# Patient Record
Sex: FEMALE | Race: WHITE | NOT HISPANIC OR LATINO | Employment: FULL TIME | URBAN - METROPOLITAN AREA
[De-identification: names, ages, dates, MRNs, and addresses within clinical notes are randomized per-mention and may not be internally consistent; named-entity substitution may affect disease eponyms.]

---

## 2017-05-02 ENCOUNTER — ALLSCRIPTS OFFICE VISIT (OUTPATIENT)
Dept: OTHER | Facility: OTHER | Age: 32
End: 2017-05-02

## 2017-05-02 ENCOUNTER — LAB REQUISITION (OUTPATIENT)
Dept: LAB | Facility: HOSPITAL | Age: 32
End: 2017-05-02
Payer: COMMERCIAL

## 2017-05-02 DIAGNOSIS — Z01.419 ENCOUNTER FOR GYNECOLOGICAL EXAMINATION WITHOUT ABNORMAL FINDING: ICD-10-CM

## 2017-05-02 DIAGNOSIS — Z87.410 HISTORY OF CERVICAL DYSPLASIA: ICD-10-CM

## 2017-05-02 PROCEDURE — G0145 SCR C/V CYTO,THINLAYER,RESCR: HCPCS | Performed by: OBSTETRICS & GYNECOLOGY

## 2017-05-08 ENCOUNTER — GENERIC CONVERSION - ENCOUNTER (OUTPATIENT)
Dept: OTHER | Facility: OTHER | Age: 32
End: 2017-05-08

## 2017-05-08 LAB
LAB AP GYN PRIMARY INTERPRETATION: NORMAL
Lab: NORMAL

## 2018-01-10 NOTE — PROGRESS NOTES
Assessment    1  Acute mucoid otitis media of left ear (381 02) (H12 487)    Plan  Acute mucoid otitis media of left ear    · Azithromycin 250 MG Oral Tablet; TAKE 2 TABLETS ON DAY 1 THEN TAKE 1  TABLET A DAY FOR 4 DAYS   · Call [phone] if: Your ear pain does not go away in 2 days ; Status:Complete;    Done: 85IQD1051 09:40AM   · Call [phone] if: Your hearing is getting worse ; Status:Complete;   Done:  87ZIU8116 09:40AM   · Call [phone] if: Your temperature is higher than 101F ; Status:Complete;   Done:  65PYH7156 09:40AM   · Things to avoid while you have an ear condition ; Status:Complete;   Done: 57ZSB8500  09:40AM    Chief Complaint  B/L ear pain, sore throat , cough, SOB, fever      History of Present Illness  HPI: [place] for congestion and ear pain over the last one to 2 days  Occasional cough as well  Low-grade fevers  She's currently taking TheraFlu with limited results  Review of Systems    Constitutional: No fever, no chills, feels well, no tiredness, no recent weight gain or loss  ENT: no ear ache, no loss of hearing, no nosebleeds or nasal discharge, no sore throat or hoarseness  Cardiovascular: no complaints of slow or fast heart rate, no chest pain, no palpitations, no leg claudication or lower extremity edema  Respiratory: no complaints of shortness of breath, no wheezing, no dyspnea on exertion, no orthopnea or PND  Breasts: no complaints of breast pain, breast lump or nipple discharge  Gastrointestinal: no complaints of abdominal pain, no constipation, no nausea or diarrhea, no vomiting, no bloody stools  Genitourinary: no complaints of dysuria, no incontinence, no pelvic pain, no dysmenorrhea, no vaginal discharge or abnormal vaginal bleeding  Musculoskeletal: no complaints of arthralgia, no myalgia, no joint swelling or stiffness, no limb pain or swelling  Integumentary: no complaints of skin rash or lesion, no itching or dry skin, no skin wounds  Neurological: no complaints of headache, no confusion, no numbness or tingling, no dizziness or fainting  Active Problems    1  Allergic rhinitis (477 9) (J30 9)   2  Fatigue (780 79) (R53 83)   3  Migraine (346 90) (G43 909)   4  Need for hepatitis B vaccination (V05 3) (Z23)   5  Recurrent cold sores (054 9) (B00 1)   6  History of Sexually Active High-risk (V69 2)    Past Medical History    1  Acute sinusitis (461 9) (J01 90)   2  History of Acute sinusitis (461 9) (J01 90)   3  History of Generalized anxiety disorder (300 02) (F41 1)   4  History of acute sinusitis (V12 69) (Z87 09)   5  History of Puncture By Hepatitis B Patient's Needle (V15 85)   6  History of Reactive airway disease (493 90) (J45 909)   7  History of Shortness of breath (786 05) (R06 02)  Active Problems And Past Medical History Reviewed: The active problems and past medical history were reviewed and updated today  Family History    1  No pertinent family history    2  No pertinent family history    3  Family history of malignant neoplasm (V16 9) (Z80 9)    4  Family history of diabetes mellitus (V18 0) (Z83 3)   5  Family history of malignant neoplasm (V16 9) (Z80 9)    6  Family history of Migraine    Social History    · Former smoker (L99 37) (N36 440)   · History of Sexually Active High-risk (V69 2)  The social history was reviewed and updated today  The social history was reviewed and is unchanged  Surgical History    1  History of Abdominoplasty    Current Meds   1  ALPRAZolam 0 25 MG Oral Tablet; 1 tablet every 8 hours as needed for anxiety; Last   Rx:05Hqr3591 Ordered   2  Claritin TABS; Take 1 tablet daily; Therapy: (Recorded:30Apr2015) to Recorded   3  Rizatriptan Benzoate 10 MG Oral Tablet Dispersible; TAKE 1 TABLET Daily PRN   headache; Therapy: 33MUK8005 to (Last Rx:03Oct2014)  Requested for: 79JIH2840 Ordered   4  Sudafed TABS; Therapy: (Recorded:04Oct2012) to Recorded   5   Ventolin  (90 Base) MCG/ACT Inhalation Aerosol Solution; INHALE 2 PUFFS   EVERY 6 HOURS AS NEEDED; Therapy: 37UYV3444 to (Evaluate:51Xer7750)  Requested for: 63QZT6947; Last   Rx:30Bfj2826 Ordered    The medication list was reviewed and updated today  Allergies    1  Benadryl Allergy TABS    Vitals   Recorded: 44AIE5358 08:45AM   Temperature 615 1 F   Systolic 175   Diastolic 70   Height 5 ft 4 in   Weight 160 lb    BMI Calculated 27 46   BSA Calculated 1 78     Physical Exam    Constitutional   General appearance: No acute distress, well appearing and well nourished  Eyes   Conjunctiva and lids: No swelling, erythema or discharge  Pupils and irises: Equal, round and reactive to light  Ears, Nose, Mouth, and Throat   External inspection of ears and nose: Normal     Otoscopic examination: Abnormal   The right tympanic membrane was normal  The left tympanic membrane was red and was bulging  The right external canal was normal  The left external canal was normal    Nasal mucosa, septum, and turbinates: Normal without edema or erythema  Oropharynx: Normal with no erythema, edema, exudate or lesions  Pulmonary   Respiratory effort: No increased work of breathing or signs of respiratory distress  Auscultation of lungs: Clear to auscultation  Cardiovascular   Palpation of heart: Normal PMI, no thrills  Auscultation of heart: Normal rate and rhythm, normal S1 and S2, without murmurs  Examination of extremities for edema and/or varicosities: Normal     Carotid pulses: Normal     Abdomen   Abdomen: Non-tender, no masses  Liver and spleen: No hepatomegaly or splenomegaly  Lymphatic   Palpation of lymph nodes in neck: No lymphadenopathy  Musculoskeletal   Gait and station: Normal     Digits and nails: Normal without clubbing or cyanosis  Inspection/palpation of joints, bones, and muscles: Normal     Skin   Skin and subcutaneous tissue: Normal without rashes or lesions      Neurologic   Cranial nerves: Cranial nerves 2-12 intact  Reflexes: 2+ and symmetric  Sensation: No sensory loss      Psychiatric   Orientation to person, place, and time: Normal     Mood and affect: Normal          Signatures   Electronically signed by : Heike Lafleur DO; Feb 1 2016  9:40AM EST                       (Author)

## 2018-01-13 VITALS
DIASTOLIC BLOOD PRESSURE: 58 MMHG | HEIGHT: 64 IN | SYSTOLIC BLOOD PRESSURE: 100 MMHG | BODY MASS INDEX: 27.91 KG/M2 | WEIGHT: 163.5 LBS

## 2018-01-15 NOTE — RESULT NOTES
Verified Results  (1) THIN PREP PAP WITH IMAGING 02UTZ1417 09:46AM Heather Gearing Order Number: [id number]     Test Name Result Flag Reference   LAB AP CASE REPORT (Report)     Gynecologic Cytology Report            Case: [id number]                  Authorizing Provider: Ian Moreno MD     Collected:      05/02/2017 0946        First Screen:     CINDY Diggs Received:      05/04/2017 0716        Specimen:  LIQUID-BASED PAP, SCREENING, Cervix   LAB AP GYN PRIMARY INTERPRETATION      Negative for intraepithelial lesion or malignancy  Electronically signed by CINDY Diggs on 5/8/2017 at 3:35 PM   LAB AP GYN SPECIMEN ADEQUACY      Satisfactory for evaluation  Endocervical/transformation zone component present  LAB AP GYN ADDITIONAL INFORMATION (Report)     [employer]'s FDA approved ,  and ThinPrep Imaging System are   utilized with strict adherence to the 's instruction manual to   prepare gynecologic and non-gynecologic cytology specimens for the   production of ThinPrep slides as well as for gynecologic ThinPrep imaging  These processes have been validated by our laboratory and/or by the     The Pap test is not a diagnostic procedure and should not be used as the   sole means to detect cervical cancer  It is only a screening procedure to   aid in the detection of cervical cancer and its precursors  Both   false-negative and false-positive results have been experienced  Your   patient's test result should be interpreted in this context together with   the history and clinical findings

## 2018-01-16 NOTE — PROGRESS NOTES
Assessment    1  Bell's palsy (351 0) (G51 0)    Plan  Bell's palsy    · PredniSONE 10 MG Oral Tablet; 6 pills daily for 3 days, then decrease by one pill  each day thereafter    Discussion/Summary    Consider starting prednisone symptoms persist or worsen  Return to office if symptoms worsen over the next several days  Consider MRI if needed  Chief Complaint  L eye droop  Negative for pain, itchiness, or draining  History of Present Illness  HPI: Patient noted some drooping of the left eye 2 days ago  No fevers or chills  No headaches  Review of Systems    Constitutional: No fever, no chills, feels well, no tiredness, no recent weight gain or loss  ENT: as noted in HPI  Cardiovascular: no complaints of slow or fast heart rate, no chest pain, no palpitations, no leg claudication or lower extremity edema  Respiratory: no complaints of shortness of breath, no wheezing, no dyspnea on exertion, no orthopnea or PND  Breasts: no complaints of breast pain, breast lump or nipple discharge  Gastrointestinal: no complaints of abdominal pain, no constipation, no nausea or diarrhea, no vomiting, no bloody stools  Genitourinary: no complaints of dysuria, no incontinence, no pelvic pain, no dysmenorrhea, no vaginal discharge or abnormal vaginal bleeding  Musculoskeletal: no complaints of arthralgia, no myalgia, no joint swelling or stiffness, no limb pain or swelling  Integumentary: no complaints of skin rash or lesion, no itching or dry skin, no skin wounds  Neurological: no complaints of headache, no confusion, no numbness or tingling, no dizziness or fainting  Active Problems    1  Acute mucoid otitis media of left ear (381 02) (H65 112)   2  Allergic rhinitis (477 9) (J30 9)   3  Fatigue (780 79) (R53 83)   4  Migraine (346 90) (G43 909)   5  Need for hepatitis B vaccination (V05 3) (Z23)   6  Recurrent cold sores (054 9) (B00 1)   7   History of Sexually Active High-risk (V69 2)    Past Medical History    1  Acute sinusitis (461 9) (J01 90)   2  History of Acute sinusitis (461 9) (J01 90)   3  History of Generalized anxiety disorder (300 02) (F41 1)   4  History of acute sinusitis (V12 69) (Z87 09)   5  History of Puncture By Hepatitis B Patient's Needle (V15 85)   6  History of Reactive airway disease (493 90) (J45 909)   7  History of Shortness of breath (786 05) (R06 02)  Active Problems And Past Medical History Reviewed: The active problems and past medical history were reviewed and updated today  Family History    1  No pertinent family history    2  No pertinent family history    3  Family history of malignant neoplasm (V16 9) (Z80 9)    4  Family history of diabetes mellitus (V18 0) (Z83 3)   5  Family history of malignant neoplasm (V16 9) (Z80 9)    6  Family history of Migraine    Social History    · Former smoker (S09 54) (V28 098)   · History of Sexually Active High-risk (V69 2)  The social history was reviewed and updated today  The social history was reviewed and is unchanged  Surgical History    1  History of Abdominoplasty    Current Meds   1  ALPRAZolam 0 25 MG Oral Tablet; 1 tablet every 8 hours as needed for anxiety; Last   Rx:23Yrn1972 Ordered   2  Azithromycin 250 MG Oral Tablet; TAKE 2 TABLETS ON DAY 1 THEN TAKE 1 TABLET A   DAY FOR 4 DAYS; Therapy: 04THS8685 to (Last Rx:55Hhe0103)  Requested for: 80MBV1400 Ordered   3  Claritin TABS; Take 1 tablet daily; Therapy: (Recorded:45Byo5923) to Recorded   4  Rizatriptan Benzoate 10 MG Oral Tablet Dispersible; TAKE 1 TABLET Daily PRN   headache; Therapy: 62BIU1220 to (Last Rx:28Hcw8556)  Requested for: 42OZA4523 Ordered   5  Sudafed TABS; Therapy: (Recorded:16Nsl3516) to Recorded   6  Ventolin  (90 Base) MCG/ACT Inhalation Aerosol Solution; INHALE 2 PUFFS   EVERY 6 HOURS AS NEEDED; Therapy: 54YWM7265 to (Evaluate:29Vvv6773)  Requested for: 06VYM9158;  Last   Rx:14Cwb8513 Ordered    The medication list was reviewed and updated today  Allergies    1  Benadryl Allergy TABS    Vitals   Recorded: 48IXT5033 11:49AM   Temperature 60 5 F   Systolic 371   Diastolic 70   Height 5 ft 4 in   Weight 160 lb    BMI Calculated 27 46   BSA Calculated 1 78     Physical Exam    Constitutional   General appearance: No acute distress, well appearing and well nourished  Eyes   Conjunctiva and lids: Abnormal   mild ptosis left eye  Pupils and irises: Equal, round and reactive to light  Ears, Nose, Mouth, and Throat   External inspection of ears and nose: Normal     Otoscopic examination: Tympanic membranes translucent with normal light reflex  Canals patent without erythema  Nasal mucosa, septum, and turbinates: Normal without edema or erythema  Oropharynx: Normal with no erythema, edema, exudate or lesions  Pulmonary   Respiratory effort: No increased work of breathing or signs of respiratory distress  Auscultation of lungs: Clear to auscultation  Cardiovascular   Palpation of heart: Normal PMI, no thrills  Auscultation of heart: Normal rate and rhythm, normal S1 and S2, without murmurs  Examination of extremities for edema and/or varicosities: Normal     Carotid pulses: Normal     Abdomen   Abdomen: Non-tender, no masses  Liver and spleen: No hepatomegaly or splenomegaly  Lymphatic   Palpation of lymph nodes in neck: No lymphadenopathy  Musculoskeletal   Gait and station: Normal     Digits and nails: Normal without clubbing or cyanosis  Inspection/palpation of joints, bones, and muscles: Normal     Skin   Skin and subcutaneous tissue: Normal without rashes or lesions  Neurologic   Cranial nerves: Cranial nerves 2-12 intact  Reflexes: 2+ and symmetric  Sensation: No sensory loss      Psychiatric   Orientation to person, place, and time: Normal     Mood and affect: Normal          Signatures   Electronically signed by : Yuliana Escobedo DO; Feb 4 2016  1:20PM EST (Author)

## 2018-01-18 NOTE — RESULT NOTES
Verified Results  (B) PAP (REFLEX TO HPV PLUS WHEN ASC-US) 14Apr2016 12:00AM Hank Silva     Test Name Result Flag Reference   PAP, LIQUID-BASED NILM     DIAGNOSIS:            Negative for intraepithelial lesion or malignancy  ADEQUACY:             Satisfactory for evaluation / Satisfactory for                         evaluation  COMMENT:              This Pap smear was screened with the assistance                         of the [employer]Prep(TM) Imaging System and                         screened by a cytotechnologist   SPECIMEN SOURCE:      PAP (RFLX HPV PLUS WHENASC-US), VAGINAL  CLINICAL INFORMATION: LMP: N/A                        Provided Diagnosis Codes: Z01 419,Z87 410,                         V13 22, V72 31                                                Cervicovaginal cytology should be considered a                         screening procedure subject to false negatives                         and false positives  Results are more reliable                         when a satisfactory sample is obtained on a                         regular repetitive basis, and should be                         interpreted together with past and current                         clinical data    ELECTRONICALLY SIGNED   BY:                   Screened By: Marito Martínez   Case                         Electronically Signed 04/18/2016

## 2018-05-17 PROBLEM — Z01.419 ENCOUNTER FOR ANNUAL ROUTINE GYNECOLOGICAL EXAMINATION: Status: ACTIVE | Noted: 2018-05-17

## 2018-05-17 PROBLEM — Z12.4 SCREENING FOR CERVICAL CANCER: Status: ACTIVE | Noted: 2018-05-17

## 2018-05-17 PROBLEM — Z87.410 HISTORY OF CERVICAL DYSPLASIA: Status: ACTIVE | Noted: 2018-05-17

## 2018-05-17 NOTE — PROGRESS NOTES
Assessment/Plan:  Normal gynecologic exam  RTO 1 yr  History of MURALI-2- annual Pap smears till '28, CoTest '18  Self breast exams monthly  BCM- NA  Ca 1,000 mg/d with Vit D  Exercise 3/wk-she does 5  Diagnoses and all orders for this visit:    Encounter for annual routine gynecological examination    Screening for cervical cancer    History of cervical dysplasia          Subjective:      Patient ID: Milagro Umana is a 35 y o  female  Eliazar is here for annual visit  She had a few questions-  My thoughts on thermal imaging for breast cancer  She has 2 friends in their 35s who been diagnosed with breast cancer  Calcium and its association with hair changes  In the past year her hair has become frizzied and she stopped ingesting dairy due to lactose intolerant  1-2 days before each menses she notes vaginal itching which resolves within a day or so  This is cyclic and has been present for the past year or 2     -her risk for breast cancer extremely low over the next 5 years  She has no family history of breast cancer  Thermal imaging is not considered main stream nor would it be covered by her insurance at this time   -she may want to take calcium supplements with magnesium because she has a history of constipation and see if that benefits her hair  Otherwise she may want to see a dermatologist   -cyclic yeast growth associated with vaginal pH was discussed  She could use 1 Monistat 7 the night before she typically becomes symptomatic  The following portions of the patient's history were reviewed and updated as appropriate:   She  has a past medical history of Allergic rhinitis (04/30/2015); Bell's palsy (02/04/2016); Generalized anxiety disorder (10/04/2012); Migraine (10/03/2014); Reactive airway disease (09/05/2013); and Shortness of breath (09/05/2013)    PMH:  Menarche 15  G0  R Bunionectomy '03  MURLAI-2 cryosurgery 5/08, + HR HPV '09  Anxiety  Cold Sores  Circumferential abdominoplasty following a 70 pound weight loss, '14  Hypothyroidism '16  Lactose intolerant '15     Patient Active Problem List    Diagnosis Date Noted    Encounter for annual routine gynecological examination 2018    Screening for cervical cancer 2018    History of cervical dysplasia 2018     She  has a past surgical history that includes Abdominoplasty and Mouth surgery  Her family history includes Cancer in her maternal grandfather and paternal grandfather; Diabetes in her paternal grandfather; Migraines in her paternal aunt; No Known Problems in her father and mother  FH:    MGF- bladder cancer 78  PU- brain cancer,  62   PGF- MI 68, DM   MGM- TIA's    She  reports that she quit smoking about 11 years ago  She does not have any smokeless tobacco history on file  She reports that she drinks alcohol  Her drug history is not on file  SH:  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  Former smoker who quit in   Not currently in a relationship  No current outpatient prescriptions on file  No current facility-administered medications for this visit  No current outpatient prescriptions on file prior to visit  No current facility-administered medications on file prior to visit  She has no allergies on file       Review of Systems   Constitutional: Negative for activity change, appetite change, fatigue and unexpected weight change  Eyes: Negative for visual disturbance  Respiratory: Negative for cough, chest tightness, shortness of breath and wheezing  Cardiovascular: Negative for chest pain, palpitations and leg swelling  Breast: Patient denies tenderness, nipple discharge, masses, or erythema  Gastrointestinal: Negative for abdominal distention, abdominal pain, blood in stool, constipation, diarrhea, nausea and vomiting  Endocrine: Negative for cold intolerance and heat intolerance     Genitourinary: Negative for decreased urine volume, difficulty urinating, dyspareunia, dysuria, frequency, hematuria, menstrual problem, pelvic pain, urgency, vaginal bleeding, vaginal discharge and vaginal pain  Musculoskeletal: Negative for arthralgias  Skin: Negative for rash  Neurological: Negative for weakness, light-headedness, numbness and headaches  Hematological: Does not bruise/bleed easily  Psychiatric/Behavioral: Negative for agitation, behavioral problems and sleep disturbance  The patient is not nervous/anxious  Objective: There were no vitals taken for this visit  Physical Exam   Constitutional: She is oriented to person, place, and time  She appears well-developed and well-nourished  HENT:   Head: Normocephalic and atraumatic  Eyes: Conjunctivae and EOM are normal  Pupils are equal, round, and reactive to light  Neck: Normal range of motion  Neck supple  No tracheal deviation present  No thyromegaly present  Cardiovascular: Normal rate, regular rhythm and normal heart sounds  No murmur heard  Pulmonary/Chest: Effort normal and breath sounds normal  No respiratory distress  She has no wheezes  Right breast exhibits no inverted nipple, no mass, no nipple discharge, no skin change and no tenderness  Left breast exhibits no inverted nipple, no mass, no nipple discharge, no skin change and no tenderness  Breasts are symmetrical    Abdominal: Soft  Bowel sounds are normal  She exhibits no distension and no mass  There is no tenderness  Genitourinary: Vagina normal and uterus normal  Rectal exam shows no external hemorrhoid  No breast swelling, tenderness, discharge or bleeding  There is no rash, tenderness or lesion on the right labia  There is no rash, tenderness or lesion on the left labia  Uterus is not deviated, not enlarged and not tender  Cervix exhibits no motion tenderness and no discharge  Right adnexum displays no mass, no tenderness and no fullness  Left adnexum displays no mass, no tenderness and no fullness     Musculoskeletal: Normal range of motion  Neurological: She is alert and oriented to person, place, and time  Skin: Skin is warm and dry  Psychiatric: She has a normal mood and affect  Her behavior is normal  Judgment and thought content normal    Nursing note and vitals reviewed

## 2018-05-18 ENCOUNTER — ANNUAL EXAM (OUTPATIENT)
Dept: GYNECOLOGY | Facility: CLINIC | Age: 33
End: 2018-05-18
Payer: COMMERCIAL

## 2018-05-18 VITALS
SYSTOLIC BLOOD PRESSURE: 112 MMHG | BODY MASS INDEX: 24.89 KG/M2 | DIASTOLIC BLOOD PRESSURE: 62 MMHG | WEIGHT: 145.8 LBS | HEIGHT: 64 IN | HEART RATE: 93 BPM

## 2018-05-18 DIAGNOSIS — Z12.4 SCREENING FOR CERVICAL CANCER: ICD-10-CM

## 2018-05-18 DIAGNOSIS — Z87.410 HISTORY OF CERVICAL DYSPLASIA: ICD-10-CM

## 2018-05-18 DIAGNOSIS — Z01.419 ENCOUNTER FOR ANNUAL ROUTINE GYNECOLOGICAL EXAMINATION: Primary | ICD-10-CM

## 2018-05-18 PROCEDURE — S0612 ANNUAL GYNECOLOGICAL EXAMINA: HCPCS | Performed by: OBSTETRICS & GYNECOLOGY

## 2018-05-18 PROCEDURE — 87624 HPV HI-RISK TYP POOLED RSLT: CPT | Performed by: OBSTETRICS & GYNECOLOGY

## 2018-05-18 PROCEDURE — G0124 SCREEN C/V THIN LAYER BY MD: HCPCS | Performed by: PATHOLOGY

## 2018-05-18 PROCEDURE — G0145 SCR C/V CYTO,THINLAYER,RESCR: HCPCS | Performed by: PATHOLOGY

## 2018-05-18 RX ORDER — CHOLECALCIFEROL (VITAMIN D3) 1250 MCG
CAPSULE ORAL
COMMUNITY
End: 2022-05-05 | Stop reason: ALTCHOICE

## 2018-05-18 RX ORDER — ALBUTEROL SULFATE 90 UG/1
2 AEROSOL, METERED RESPIRATORY (INHALATION) EVERY 6 HOURS PRN
COMMUNITY
Start: 2013-09-05

## 2018-05-18 RX ORDER — CHOLECALCIFEROL (VITAMIN D3) 125 MCG
TABLET ORAL
COMMUNITY
End: 2020-05-29 | Stop reason: ALTCHOICE

## 2018-05-18 RX ORDER — LEVOTHYROXINE SODIUM 0.03 MG/1
TABLET ORAL
COMMUNITY
End: 2021-07-22

## 2018-05-18 RX ORDER — RIZATRIPTAN BENZOATE 10 MG/1
1 TABLET, ORALLY DISINTEGRATING ORAL DAILY PRN
COMMUNITY
Start: 2014-10-03

## 2018-05-18 RX ORDER — ALPRAZOLAM 0.25 MG/1
1 TABLET ORAL EVERY 8 HOURS PRN
COMMUNITY
End: 2021-07-22

## 2018-05-22 LAB — HPV RRNA GENITAL QL NAA+PROBE: NORMAL

## 2018-05-24 LAB
LAB AP GYN PRIMARY INTERPRETATION: NORMAL
Lab: NORMAL
PATH INTERP SPEC-IMP: NORMAL

## 2018-11-08 ENCOUNTER — TELEPHONE (OUTPATIENT)
Dept: GYNECOLOGY | Facility: CLINIC | Age: 33
End: 2018-11-08

## 2018-11-08 NOTE — TELEPHONE ENCOUNTER
Pt called stating she is getting migraines every other month before her period  She wants to know if there anything she can do about it

## 2018-11-08 NOTE — TELEPHONE ENCOUNTER
When women have migraines is usually centered around their cycles  Your family physician or a neurologist would take care of this  If your on oral contraceptives then we would recommend you stop      Please call the patient with this message

## 2018-12-31 ENCOUNTER — OFFICE VISIT (OUTPATIENT)
Dept: GYNECOLOGY | Facility: CLINIC | Age: 33
End: 2018-12-31
Payer: COMMERCIAL

## 2018-12-31 VITALS — WEIGHT: 167.2 LBS | BODY MASS INDEX: 28.54 KG/M2 | HEIGHT: 64 IN

## 2018-12-31 DIAGNOSIS — Z30.09 ENCOUNTER FOR OTHER GENERAL COUNSELING OR ADVICE ON CONTRACEPTION: Primary | ICD-10-CM

## 2018-12-31 PROBLEM — Z30.9 CONTRACEPTIVE MANAGEMENT: Status: ACTIVE | Noted: 2018-12-31

## 2018-12-31 PROCEDURE — 99213 OFFICE O/P EST LOW 20 MIN: CPT | Performed by: OBSTETRICS & GYNECOLOGY

## 2018-12-31 NOTE — PROGRESS NOTES
Assessment/Plan:  Return the office for ParaGard insertion during menses  Two Advil 1 hr beforehand       Diagnoses and all orders for this visit:    Encounter for other general counseling or advice on contraception      Birth control options were fully discussed including withdrawal, condoms, oral contraceptives, vaginal contraceptive, long acting injections, Nexplanon, and IUDs which include [address] [facility],[address], [place] [address], [place], and [place]  This included efficacy and benefits versus risks  She was most interested in IUDs  Since she has not delivered a child I thought [place] would be her best option  However, she wanted to steer clear of any hormonal related contraceptive  ParaGard was explained in great detail regarding patient's satisfaction, retention rates, possible adverse effect on menses both in volume and pain, and the risks of insertion- infection, perforation, and expulsion  In the and she wanted us to order a ParaGard  Today's discussion took 16 min all of which was spent on counseling  Subjective:        Patient ID: Marsha Morales is a 35 y o  female  Eliazar has reunited with her on and off boyfriend of 10 years  They have been having intercourse for the past month without protection  Her periods are regular and last 5 days  They are heavy the 1st 2  In the past she had used oral contraceptives but currently would like to avoid anything with hormones in it  She has never had a child  She is interested in the [address] IUD  The following portions of the patient's history were reviewed and updated as appropriate: She  has a past medical history of Allergic rhinitis (04/30/2015); Bell's palsy (02/04/2016); Generalized anxiety disorder (10/04/2012); Migraine (10/03/2014); Reactive airway disease (09/05/2013); and Shortness of breath (09/05/2013)    Patient Active Problem List    Diagnosis Date Noted    Contraceptive management 12/31/2018    Encounter for annual routine gynecological examination 05/17/2018    Screening for cervical cancer 05/17/2018    History of cervical dysplasia 05/17/2018     She  has a past surgical history that includes Abdominoplasty and Mouth surgery  Her family history includes Cancer in her maternal grandfather; Diabetes in her paternal grandfather; Heart disease in her paternal grandfather; Migraines in her paternal aunt; No Known Problems in her brother, father, mother, and sister  She  reports that she quit smoking about 12 years ago  She has never used smokeless tobacco  She reports that she drinks alcohol  She reports that she does not use drugs  Current Outpatient Prescriptions   Medication Sig Dispense Refill    ALPRAZolam (XANAX) 0 25 mg tablet Take 1 tablet by mouth every 8 (eight) hours as needed      Cholecalciferol (VITAMIN D3) 20349 units CAPS Take by mouth      Ergocalciferol (VITAMIN D2) 2000 units TABS Take by mouth      levothyroxine 25 mcg tablet Take by mouth      albuterol (VENTOLIN HFA) 90 mcg/act inhaler Inhale 2 puffs every 6 (six) hours as needed      rizatriptan (MAXALT-MLT) 10 MG disintegrating tablet Take 1 tablet by mouth daily as needed       No current facility-administered medications for this visit  Current Outpatient Prescriptions on File Prior to Visit   Medication Sig    ALPRAZolam (XANAX) 0 25 mg tablet Take 1 tablet by mouth every 8 (eight) hours as needed    Cholecalciferol (VITAMIN D3) 26917 units CAPS Take by mouth    Ergocalciferol (VITAMIN D2) 2000 units TABS Take by mouth    levothyroxine 25 mcg tablet Take by mouth    albuterol (VENTOLIN HFA) 90 mcg/act inhaler Inhale 2 puffs every 6 (six) hours as needed    rizatriptan (MAXALT-MLT) 10 MG disintegrating tablet Take 1 tablet by mouth daily as needed     No current facility-administered medications on file prior to visit  She is allergic to diphenhydramine       Review of Systems   Constitutional: Negative      Respiratory: Negative for chest tightness and shortness of breath  Cardiovascular: Negative for chest pain  Gastrointestinal: Negative for abdominal pain  Genitourinary: Negative for dyspareunia and pelvic pain  Objective:    Vitals:    12/31/18 1500   Weight: 75 8 kg (167 lb 3 2 oz)   Height: 5' 4" (1 626 m)            Physical Exam   Constitutional: She is oriented to person, place, and time  She appears well-developed and well-nourished  No distress  Pulmonary/Chest: Effort normal    Neurological: She is alert and oriented to person, place, and time  Skin: Skin is warm and dry  Psychiatric: She has a normal mood and affect   Her behavior is normal  Judgment and thought content normal

## 2019-01-28 ENCOUNTER — PROCEDURE VISIT (OUTPATIENT)
Dept: GYNECOLOGY | Facility: CLINIC | Age: 34
End: 2019-01-28
Payer: COMMERCIAL

## 2019-01-28 VITALS
BODY MASS INDEX: 28.82 KG/M2 | DIASTOLIC BLOOD PRESSURE: 58 MMHG | HEIGHT: 64 IN | SYSTOLIC BLOOD PRESSURE: 118 MMHG | WEIGHT: 168.8 LBS

## 2019-01-28 DIAGNOSIS — Z30.430 ENCOUNTER FOR IUD INSERTION: Primary | ICD-10-CM

## 2019-01-28 PROCEDURE — 58300 INSERT INTRAUTERINE DEVICE: CPT | Performed by: OBSTETRICS & GYNECOLOGY

## 2019-01-28 RX ORDER — COPPER 313.4 MG/1
1 INTRAUTERINE DEVICE INTRAUTERINE ONCE
Status: COMPLETED | OUTPATIENT
Start: 2019-01-28 | End: 2019-01-28

## 2019-01-28 RX ORDER — COPPER 313.4 MG/1
1 INTRAUTERINE DEVICE INTRAUTERINE ONCE
COMMUNITY
End: 2021-07-22

## 2019-01-28 RX ADMIN — COPPER 1 INTRA UTERINE DEVICE: 313.4 INTRAUTERINE DEVICE INTRAUTERINE at 16:12

## 2019-01-28 NOTE — PROGRESS NOTES
Iud insertions  Date/Time: 1/28/2019 3:46 PM  Performed by: Claude Mountain by: Marga Valdez     Consent:     Consent obtained:  Verbal and written    Consent given by:  Patient    Procedure risks and benefits discussed: yes      Patient questions answered: yes      Patient agrees, verbalizes understanding, and wants to proceed: yes      Instructions and paperwork completed: yes    Procedure:     Pelvic exam performed: December  Negative urine pregnancy test: LMP last week, no intercourse since mid December  Cervix cleaned and prepped: yes      Speculum placed in vagina: yes      Tenaculum applied to cervix: yes      Uterus sounded: yes      IUD inserted with no complications: yes      IUD type:  ParaGard    Strings trimmed: yes      Uterus sound depth (cm):  8  Post-procedure:     Patient tolerated procedure well: yes      Patient will follow up after next period: yes        Patient did very well  Will return in 1 month for follow-up  Instructions given

## 2019-02-27 ENCOUNTER — OFFICE VISIT (OUTPATIENT)
Dept: GYNECOLOGY | Facility: CLINIC | Age: 34
End: 2019-02-27
Payer: COMMERCIAL

## 2019-02-27 VITALS
WEIGHT: 166.6 LBS | SYSTOLIC BLOOD PRESSURE: 102 MMHG | DIASTOLIC BLOOD PRESSURE: 56 MMHG | HEIGHT: 64 IN | BODY MASS INDEX: 28.44 KG/M2

## 2019-02-27 DIAGNOSIS — Z97.5 IUD (INTRAUTERINE DEVICE) IN PLACE: Primary | ICD-10-CM

## 2019-02-27 PROCEDURE — 99212 OFFICE O/P EST SF 10 MIN: CPT | Performed by: OBSTETRICS & GYNECOLOGY

## 2019-02-27 RX ORDER — ERGOCALCIFEROL 1.25 MG/1
CAPSULE ORAL
COMMUNITY
Start: 2019-02-17 | End: 2020-05-29

## 2019-02-27 RX ORDER — AMOXICILLIN AND CLAVULANATE POTASSIUM 875; 125 MG/1; MG/1
TABLET, FILM COATED ORAL
COMMUNITY
Start: 2019-01-07 | End: 2021-07-22

## 2019-02-27 NOTE — PROGRESS NOTES
Assessment/Plan:  Normal IUD check- 2 white strings present- ParaGard  RTO annual    There are no diagnoses linked to this encounter  Subjective:        Patient ID: Niki Thurston is a 35 y o  female  Oswaldo  is without complaints  She was unable to feel 1 string  She was pleasantly surprised that her menses was not as heavy or as long as she was prepared for  She has noted more of a mucus discharge  The following portions of the patient's history were reviewed and updated as appropriate: She  has a past medical history of Allergic rhinitis (2015), Bell's palsy (2016), Generalized anxiety disorder (10/04/2012), Migraine (10/03/2014), Reactive airway disease (2013), and Shortness of breath (2013)  Patient Active Problem List    Diagnosis Date Noted    Encounter for IUD insertion 2019    Contraceptive management 2018    Encounter for annual routine gynecological examination 2018    Screening for cervical cancer 2018    History of cervical dysplasia 2018     She  has a past surgical history that includes Abdominoplasty and Mouth surgery  Her family history includes Breast cancer in her paternal aunt; Cancer in her maternal grandfather; Diabetes in her paternal grandfather; Heart disease in her paternal grandfather; Migraines in her paternal aunt; No Known Problems in her brother, father, mother, and sister  She  reports that she quit smoking about 12 years ago  She has never used smokeless tobacco  She reports that she drinks alcohol  She reports that she does not use drugs    Current Outpatient Medications   Medication Sig Dispense Refill    albuterol (VENTOLIN HFA) 90 mcg/act inhaler Inhale 2 puffs every 6 (six) hours as needed      ALPRAZolam (XANAX) 0 25 mg tablet Take 1 tablet by mouth every 8 (eight) hours as needed      amoxicillin-clavulanate (AUGMENTIN) 875-125 mg per tablet       Cholecalciferol (VITAMIN D3) 99254 units CAPS Take by mouth      Ergocalciferol (VITAMIN D2) 2000 units TABS Take by mouth      ergocalciferol (VITAMIN D2) 50,000 units       intrauterine copper (PARAGARD) IUD 1 each by Intrauterine route once      levothyroxine 25 mcg tablet Take by mouth      rizatriptan (MAXALT-MLT) 10 MG disintegrating tablet Take 1 tablet by mouth daily as needed       No current facility-administered medications for this visit  Current Outpatient Medications on File Prior to Visit   Medication Sig    albuterol (VENTOLIN HFA) 90 mcg/act inhaler Inhale 2 puffs every 6 (six) hours as needed    ALPRAZolam (XANAX) 0 25 mg tablet Take 1 tablet by mouth every 8 (eight) hours as needed    amoxicillin-clavulanate (AUGMENTIN) 875-125 mg per tablet     Cholecalciferol (VITAMIN D3) 37140 units CAPS Take by mouth    Ergocalciferol (VITAMIN D2) 2000 units TABS Take by mouth    ergocalciferol (VITAMIN D2) 50,000 units     intrauterine copper (PARAGARD) IUD 1 each by Intrauterine route once    levothyroxine 25 mcg tablet Take by mouth    rizatriptan (MAXALT-MLT) 10 MG disintegrating tablet Take 1 tablet by mouth daily as needed     No current facility-administered medications on file prior to visit  She is allergic to diphenhydramine       Review of Systems   Constitutional: Negative  Objective:    Vitals:    02/27/19 1554   BP: 102/56   BP Location: Right arm   Patient Position: Sitting   Cuff Size: Standard   Weight: 75 6 kg (166 lb 9 6 oz)   Height: 5' 4" (1 626 m)            Physical Exam   Constitutional: She is oriented to person, place, and time  She appears well-developed and well-nourished  No distress  Pulmonary/Chest: Effort normal    Genitourinary: Vagina normal  Cervix exhibits no discharge and no friability  Genitourinary Comments: IUD string present  Neurological: She is alert and oriented to person, place, and time  Nursing note and vitals reviewed

## 2019-05-24 ENCOUNTER — ANNUAL EXAM (OUTPATIENT)
Dept: GYNECOLOGY | Facility: CLINIC | Age: 34
End: 2019-05-24
Payer: COMMERCIAL

## 2019-05-24 VITALS
BODY MASS INDEX: 28.13 KG/M2 | HEIGHT: 64 IN | SYSTOLIC BLOOD PRESSURE: 118 MMHG | WEIGHT: 164.8 LBS | DIASTOLIC BLOOD PRESSURE: 62 MMHG

## 2019-05-24 DIAGNOSIS — Z01.419 ENCOUNTER FOR ANNUAL ROUTINE GYNECOLOGICAL EXAMINATION: Primary | ICD-10-CM

## 2019-05-24 DIAGNOSIS — Z97.5 IUD (INTRAUTERINE DEVICE) IN PLACE: ICD-10-CM

## 2019-05-24 DIAGNOSIS — Z12.4 SCREENING FOR CERVICAL CANCER: ICD-10-CM

## 2019-05-24 DIAGNOSIS — Z87.410 HISTORY OF CERVICAL DYSPLASIA: ICD-10-CM

## 2019-05-24 PROCEDURE — 87624 HPV HI-RISK TYP POOLED RSLT: CPT | Performed by: OBSTETRICS & GYNECOLOGY

## 2019-05-24 PROCEDURE — G0124 SCREEN C/V THIN LAYER BY MD: HCPCS | Performed by: PATHOLOGY

## 2019-05-24 PROCEDURE — G0145 SCR C/V CYTO,THINLAYER,RESCR: HCPCS | Performed by: PATHOLOGY

## 2019-05-24 PROCEDURE — S0612 ANNUAL GYNECOLOGICAL EXAMINA: HCPCS | Performed by: OBSTETRICS & GYNECOLOGY

## 2019-06-04 LAB
LAB AP GYN PRIMARY INTERPRETATION: ABNORMAL
Lab: ABNORMAL
PATH INTERP SPEC-IMP: ABNORMAL

## 2019-06-06 LAB
HPV HR 12 DNA CVX QL NAA+PROBE: NEGATIVE
HPV16 DNA CVX QL NAA+PROBE: NEGATIVE
HPV18 DNA CVX QL NAA+PROBE: NEGATIVE

## 2020-03-03 ENCOUNTER — TRANSCRIBE ORDERS (OUTPATIENT)
Dept: URGENT CARE | Facility: CLINIC | Age: 35
End: 2020-03-03

## 2020-03-03 ENCOUNTER — APPOINTMENT (OUTPATIENT)
Dept: LAB | Facility: CLINIC | Age: 35
End: 2020-03-03
Payer: COMMERCIAL

## 2020-03-03 DIAGNOSIS — E78.5 HYPERLIPIDEMIA, UNSPECIFIED HYPERLIPIDEMIA TYPE: ICD-10-CM

## 2020-03-03 DIAGNOSIS — R73.01 IMPAIRED FASTING GLUCOSE: Primary | ICD-10-CM

## 2020-03-03 DIAGNOSIS — D51.0 PERNICIOUS ANEMIA: ICD-10-CM

## 2020-03-03 DIAGNOSIS — E03.9 PRIMARY HYPOTHYROIDISM: ICD-10-CM

## 2020-03-03 DIAGNOSIS — D64.9 ANEMIA, UNSPECIFIED TYPE: ICD-10-CM

## 2020-03-03 DIAGNOSIS — E55.9 VITAMIN D DEFICIENCY: ICD-10-CM

## 2020-03-03 DIAGNOSIS — R73.01 IMPAIRED FASTING GLUCOSE: ICD-10-CM

## 2020-03-03 LAB
25(OH)D3 SERPL-MCNC: 31 NG/ML (ref 30–100)
ALBUMIN SERPL BCP-MCNC: 4 G/DL (ref 3.5–5)
ALP SERPL-CCNC: 45 U/L (ref 46–116)
ALT SERPL W P-5'-P-CCNC: 19 U/L (ref 12–78)
ANION GAP SERPL CALCULATED.3IONS-SCNC: 5 MMOL/L (ref 4–13)
AST SERPL W P-5'-P-CCNC: 28 U/L (ref 5–45)
BACTERIA UR QL AUTO: ABNORMAL /HPF
BASOPHILS # BLD AUTO: 0.02 THOUSANDS/ΜL (ref 0–0.1)
BASOPHILS NFR BLD AUTO: 0 % (ref 0–1)
BILIRUB SERPL-MCNC: 0.39 MG/DL (ref 0.2–1)
BILIRUB UR QL STRIP: NEGATIVE
BUN SERPL-MCNC: 15 MG/DL (ref 5–25)
CALCIUM SERPL-MCNC: 8.8 MG/DL (ref 8.3–10.1)
CHLORIDE SERPL-SCNC: 107 MMOL/L (ref 100–108)
CHOLEST SERPL-MCNC: 157 MG/DL (ref 50–200)
CLARITY UR: ABNORMAL
CO2 SERPL-SCNC: 27 MMOL/L (ref 21–32)
COLOR UR: YELLOW
CREAT SERPL-MCNC: 0.87 MG/DL (ref 0.6–1.3)
EOSINOPHIL # BLD AUTO: 0.05 THOUSAND/ΜL (ref 0–0.61)
EOSINOPHIL NFR BLD AUTO: 1 % (ref 0–6)
ERYTHROCYTE [DISTWIDTH] IN BLOOD BY AUTOMATED COUNT: 14.6 % (ref 11.6–15.1)
FERRITIN SERPL-MCNC: 6 NG/ML (ref 8–388)
GFR SERPL CREATININE-BSD FRML MDRD: 87 ML/MIN/1.73SQ M
GLUCOSE P FAST SERPL-MCNC: 78 MG/DL (ref 65–99)
GLUCOSE UR STRIP-MCNC: NEGATIVE MG/DL
HCT VFR BLD AUTO: 39.4 % (ref 34.8–46.1)
HDLC SERPL-MCNC: 63 MG/DL
HGB BLD-MCNC: 12.3 G/DL (ref 11.5–15.4)
HGB UR QL STRIP.AUTO: ABNORMAL
IMM GRANULOCYTES # BLD AUTO: 0 THOUSAND/UL (ref 0–0.2)
IMM GRANULOCYTES NFR BLD AUTO: 0 % (ref 0–2)
KETONES UR STRIP-MCNC: NEGATIVE MG/DL
LDLC SERPL CALC-MCNC: 83 MG/DL (ref 0–100)
LEUKOCYTE ESTERASE UR QL STRIP: NEGATIVE
LYMPHOCYTES # BLD AUTO: 1.59 THOUSANDS/ΜL (ref 0.6–4.47)
LYMPHOCYTES NFR BLD AUTO: 30 % (ref 14–44)
MCH RBC QN AUTO: 28.3 PG (ref 26.8–34.3)
MCHC RBC AUTO-ENTMCNC: 31.2 G/DL (ref 31.4–37.4)
MCV RBC AUTO: 91 FL (ref 82–98)
MONOCYTES # BLD AUTO: 0.54 THOUSAND/ΜL (ref 0.17–1.22)
MONOCYTES NFR BLD AUTO: 10 % (ref 4–12)
NEUTROPHILS # BLD AUTO: 3.13 THOUSANDS/ΜL (ref 1.85–7.62)
NEUTS SEG NFR BLD AUTO: 59 % (ref 43–75)
NITRITE UR QL STRIP: NEGATIVE
NON-SQ EPI CELLS URNS QL MICRO: ABNORMAL /HPF
NONHDLC SERPL-MCNC: 94 MG/DL
NRBC BLD AUTO-RTO: 0 /100 WBCS
PH UR STRIP.AUTO: 8 [PH]
PLATELET # BLD AUTO: 300 THOUSANDS/UL (ref 149–390)
PMV BLD AUTO: 10 FL (ref 8.9–12.7)
POTASSIUM SERPL-SCNC: 4.3 MMOL/L (ref 3.5–5.3)
PROT SERPL-MCNC: 7.1 G/DL (ref 6.4–8.2)
PROT UR STRIP-MCNC: ABNORMAL MG/DL
RBC # BLD AUTO: 4.35 MILLION/UL (ref 3.81–5.12)
RBC #/AREA URNS AUTO: ABNORMAL /HPF
SODIUM SERPL-SCNC: 139 MMOL/L (ref 136–145)
SP GR UR STRIP.AUTO: 1.02 (ref 1–1.03)
T4 FREE SERPL-MCNC: 1.05 NG/DL (ref 0.76–1.46)
TIBC SERPL-MCNC: 384 UG/DL (ref 250–450)
TRI-PHOS CRY URNS QL MICRO: ABNORMAL /HPF
TRIGL SERPL-MCNC: 53 MG/DL
TSH SERPL DL<=0.05 MIU/L-ACNC: 3.24 UIU/ML (ref 0.36–3.74)
UROBILINOGEN UR QL STRIP.AUTO: 0.2 E.U./DL
VIT B12 SERPL-MCNC: 367 PG/ML (ref 100–900)
WBC # BLD AUTO: 5.33 THOUSAND/UL (ref 4.31–10.16)
WBC #/AREA URNS AUTO: ABNORMAL /HPF

## 2020-03-03 PROCEDURE — 82306 VITAMIN D 25 HYDROXY: CPT

## 2020-03-03 PROCEDURE — 81001 URINALYSIS AUTO W/SCOPE: CPT

## 2020-03-03 PROCEDURE — 82728 ASSAY OF FERRITIN: CPT

## 2020-03-03 PROCEDURE — 82607 VITAMIN B-12: CPT

## 2020-03-03 PROCEDURE — 80061 LIPID PANEL: CPT

## 2020-03-03 PROCEDURE — 85025 COMPLETE CBC W/AUTO DIFF WBC: CPT

## 2020-03-03 PROCEDURE — 84443 ASSAY THYROID STIM HORMONE: CPT

## 2020-03-03 PROCEDURE — 84439 ASSAY OF FREE THYROXINE: CPT

## 2020-03-03 PROCEDURE — 83550 IRON BINDING TEST: CPT

## 2020-03-03 PROCEDURE — 80053 COMPREHEN METABOLIC PANEL: CPT

## 2020-03-03 PROCEDURE — 36415 COLL VENOUS BLD VENIPUNCTURE: CPT

## 2020-03-10 ENCOUNTER — TELEPHONE (OUTPATIENT)
Dept: GYNECOLOGY | Facility: CLINIC | Age: 35
End: 2020-03-10

## 2020-03-10 NOTE — TELEPHONE ENCOUNTER
Pt wants to have her Paragard IUD taken out because she feels like it is cause her to not lose weight when she is on a calorie diet  Could it be from the IUD and should she have it taken out?

## 2020-03-10 NOTE — TELEPHONE ENCOUNTER
Please let Margot know that there are no hormones or anything else that can cause weight gain or prevent weight loss with the ParaGard  ParaGard is sometimes associated with heavier, more painful menses which we had discussed  Bariatric medicine provides very thorough advice for weight loss without surgery  If you could give her a phone number that might help  I do not recommend the IUD be removed for this concern

## 2020-03-19 ENCOUNTER — APPOINTMENT (OUTPATIENT)
Dept: LAB | Facility: CLINIC | Age: 35
End: 2020-03-19
Payer: COMMERCIAL

## 2020-03-19 ENCOUNTER — TRANSCRIBE ORDERS (OUTPATIENT)
Dept: URGENT CARE | Facility: CLINIC | Age: 35
End: 2020-03-19

## 2020-03-19 DIAGNOSIS — N95.1 SYMPTOMATIC MENOPAUSAL OR FEMALE CLIMACTERIC STATES: ICD-10-CM

## 2020-03-19 DIAGNOSIS — N95.1 SYMPTOMATIC MENOPAUSAL OR FEMALE CLIMACTERIC STATES: Primary | ICD-10-CM

## 2020-03-19 LAB
ESTRADIOL SERPL-MCNC: 77 PG/ML
FSH SERPL-ACNC: 5.3 MIU/ML
LH SERPL-ACNC: 5.4 MIU/ML

## 2020-03-19 PROCEDURE — 82670 ASSAY OF TOTAL ESTRADIOL: CPT

## 2020-03-19 PROCEDURE — 83001 ASSAY OF GONADOTROPIN (FSH): CPT

## 2020-03-19 PROCEDURE — 36415 COLL VENOUS BLD VENIPUNCTURE: CPT

## 2020-03-19 PROCEDURE — 83002 ASSAY OF GONADOTROPIN (LH): CPT

## 2020-05-29 ENCOUNTER — ANNUAL EXAM (OUTPATIENT)
Dept: GYNECOLOGY | Facility: CLINIC | Age: 35
End: 2020-05-29
Payer: COMMERCIAL

## 2020-05-29 VITALS
HEIGHT: 64 IN | BODY MASS INDEX: 30.56 KG/M2 | WEIGHT: 179 LBS | SYSTOLIC BLOOD PRESSURE: 102 MMHG | DIASTOLIC BLOOD PRESSURE: 62 MMHG

## 2020-05-29 DIAGNOSIS — Z01.419 ENCOUNTER FOR ANNUAL ROUTINE GYNECOLOGICAL EXAMINATION: Primary | ICD-10-CM

## 2020-05-29 DIAGNOSIS — Z87.410 HISTORY OF CERVICAL DYSPLASIA: ICD-10-CM

## 2020-05-29 DIAGNOSIS — Z97.5 IUD (INTRAUTERINE DEVICE) IN PLACE: ICD-10-CM

## 2020-05-29 DIAGNOSIS — Z12.4 SCREENING FOR CERVICAL CANCER: ICD-10-CM

## 2020-05-29 DIAGNOSIS — R63.5 UNEXPLAINED WEIGHT GAIN: ICD-10-CM

## 2020-05-29 PROCEDURE — G0145 SCR C/V CYTO,THINLAYER,RESCR: HCPCS | Performed by: OBSTETRICS & GYNECOLOGY

## 2020-05-29 PROCEDURE — S0612 ANNUAL GYNECOLOGICAL EXAMINA: HCPCS | Performed by: OBSTETRICS & GYNECOLOGY

## 2020-05-29 RX ORDER — NITROFURANTOIN 25; 75 MG/1; MG/1
CAPSULE ORAL
COMMUNITY
Start: 2020-03-30 | End: 2021-07-22

## 2020-06-08 LAB
LAB AP GYN PRIMARY INTERPRETATION: NORMAL
Lab: NORMAL

## 2020-12-12 ENCOUNTER — NURSE TRIAGE (OUTPATIENT)
Dept: OTHER | Facility: OTHER | Age: 35
End: 2020-12-12

## 2020-12-12 DIAGNOSIS — U07.1 COVID-19 DETERMINED BY CLINICAL DIAGNOSTIC CRITERIA: Primary | ICD-10-CM

## 2020-12-13 DIAGNOSIS — U07.1 COVID-19 DETERMINED BY CLINICAL DIAGNOSTIC CRITERIA: ICD-10-CM

## 2020-12-13 PROCEDURE — U0003 INFECTIOUS AGENT DETECTION BY NUCLEIC ACID (DNA OR RNA); SEVERE ACUTE RESPIRATORY SYNDROME CORONAVIRUS 2 (SARS-COV-2) (CORONAVIRUS DISEASE [COVID-19]), AMPLIFIED PROBE TECHNIQUE, MAKING USE OF HIGH THROUGHPUT TECHNOLOGIES AS DESCRIBED BY CMS-2020-01-R: HCPCS | Performed by: FAMILY MEDICINE

## 2020-12-15 LAB — SARS-COV-2 RNA SPEC QL NAA+PROBE: NOT DETECTED

## 2021-02-03 PROCEDURE — 58301 REMOVE INTRAUTERINE DEVICE: CPT | Performed by: OBSTETRICS & GYNECOLOGY

## 2021-02-04 ENCOUNTER — PROCEDURE VISIT (OUTPATIENT)
Dept: OBGYN CLINIC | Facility: CLINIC | Age: 36
End: 2021-02-04

## 2021-02-04 VITALS
SYSTOLIC BLOOD PRESSURE: 112 MMHG | WEIGHT: 182.6 LBS | HEIGHT: 64 IN | BODY MASS INDEX: 31.18 KG/M2 | DIASTOLIC BLOOD PRESSURE: 72 MMHG

## 2021-02-04 DIAGNOSIS — Z30.432 ENCOUNTER FOR IUD REMOVAL: Primary | ICD-10-CM

## 2021-02-04 DIAGNOSIS — Z12.39 ENCOUNTER FOR SCREENING BREAST EXAMINATION: ICD-10-CM

## 2021-02-04 DIAGNOSIS — N63.0 BREAST LUMP: ICD-10-CM

## 2021-02-04 PROCEDURE — 99212 OFFICE O/P EST SF 10 MIN: CPT | Performed by: OBSTETRICS & GYNECOLOGY

## 2021-02-04 NOTE — PROGRESS NOTES
Assessment/Plan:   Normal breast exam   IUD easily removed  Withdrawal for contraception  Does not want to add another variable to the weight gain concern       Diagnoses and all orders for this visit:    Encounter for IUD removal  -     Iud removal    Breast lump    Encounter for screening breast examination              Subjective:        Patient ID: Tania Bobby is a 28 y o  female  Farrel Buggy is here to have the IUD removed  She continued to gain weight and could not find any other etiology when last seen in May she weighed 179  In September she weighed 190 and currently which she weighs 182  She has been evaluated by her PCP,  has seen a nutritionist,  and has use multiple diets  It has been hypothesis size that this may be the result of an inflammatory process possibly related to copper  She has also experienced migraines and vertigo  The ParaGard was placed in January of 2019  In 2015 she did weigh 146 lb  Her menses began 3 days ago  It was the normal timing  Yesterday doing self-breast exam she thought she felt a lump in the left breast but not today  She is getting  in May with the hope of conceiving at the end of the summer  The following portions of the patient's history were reviewed and updated as appropriate: She  has a past medical history of Allergic rhinitis (04/30/2015), Bell's palsy (02/04/2016), Generalized anxiety disorder (10/04/2012), Migraine (10/03/2014), Reactive airway disease (09/05/2013), and Shortness of breath (09/05/2013)    Patient Active Problem List    Diagnosis Date Noted    IUD (intrauterine device) in place 02/27/2019    Encounter for IUD insertion 01/28/2019    Contraceptive management 12/31/2018    Encounter for annual routine gynecological examination 05/17/2018    Screening for cervical cancer 05/17/2018    History of cervical dysplasia 05/17/2018     She  has a past surgical history that includes Abdominoplasty; Mouth surgery; and Bunionectomy  Her family history includes Breast cancer in her paternal aunt; Cancer in her maternal grandfather; Diabetes in her paternal grandfather; Heart disease in her paternal grandfather; Migraines in her paternal aunt; No Known Problems in her brother, father, mother, and sister  She  reports that she quit smoking about 14 years ago  She has never used smokeless tobacco  She reports current alcohol use  She reports that she does not use drugs  Current Outpatient Medications   Medication Sig Dispense Refill    albuterol (VENTOLIN HFA) 90 mcg/act inhaler Inhale 2 puffs every 6 (six) hours as needed      ALPRAZolam (XANAX) 0 25 mg tablet Take 1 tablet by mouth every 8 (eight) hours as needed      amoxicillin-clavulanate (AUGMENTIN) 875-125 mg per tablet       Cholecalciferol (VITAMIN D3) 74429 units CAPS Take by mouth      intrauterine copper (PARAGARD) IUD 1 each by Intrauterine route once      levothyroxine 25 mcg tablet Take by mouth      nitrofurantoin (MACROBID) 100 mg capsule       Probiotic Product (PROBIOTIC PO) Take by mouth      rizatriptan (MAXALT-MLT) 10 MG disintegrating tablet Take 1 tablet by mouth daily as needed       No current facility-administered medications for this visit        Current Outpatient Medications on File Prior to Visit   Medication Sig    albuterol (VENTOLIN HFA) 90 mcg/act inhaler Inhale 2 puffs every 6 (six) hours as needed    ALPRAZolam (XANAX) 0 25 mg tablet Take 1 tablet by mouth every 8 (eight) hours as needed    amoxicillin-clavulanate (AUGMENTIN) 875-125 mg per tablet     Cholecalciferol (VITAMIN D3) 07447 units CAPS Take by mouth    intrauterine copper (PARAGARD) IUD 1 each by Intrauterine route once    levothyroxine 25 mcg tablet Take by mouth    nitrofurantoin (MACROBID) 100 mg capsule     Probiotic Product (PROBIOTIC PO) Take by mouth    rizatriptan (MAXALT-MLT) 10 MG disintegrating tablet Take 1 tablet by mouth daily as needed     No current facility-administered medications on file prior to visit  She is allergic to diphenhydramine       Review of Systems   Constitutional: Positive for unexpected weight change  Negative for activity change, appetite change and fatigue  Gastrointestinal: Negative for abdominal pain  Genitourinary: Negative for dyspareunia, menstrual problem, pelvic pain and vaginal pain  Skin: Negative for rash  Questionable breast lump  She felt something yesterday bone repeating the exam today was not palpable  Neurological: Positive for dizziness and light-headedness  Objective:    Vitals:    02/04/21 1123   BP: 112/72   BP Location: Left arm   Patient Position: Sitting   Cuff Size: Standard   Weight: 82 8 kg (182 lb 9 6 oz)   Height: 5' 4" (1 626 m)            Physical Exam  Vitals signs and nursing note reviewed  Exam conducted with a chaperone present  Constitutional:       General: She is not in acute distress  Appearance: Normal appearance  She is not ill-appearing, toxic-appearing or diaphoretic  HENT:      Head: Normocephalic and atraumatic  Eyes:      General: No scleral icterus  Right eye: No discharge  Left eye: No discharge  Extraocular Movements: Extraocular movements intact  Neck:      Musculoskeletal: Normal range of motion and neck supple  No neck rigidity or muscular tenderness  Pulmonary:      Effort: Pulmonary effort is normal  No respiratory distress  Chest:      Breasts:         Right: Normal  No swelling, bleeding, inverted nipple, mass, nipple discharge, skin change or tenderness  Left: Normal  No swelling, bleeding, inverted nipple, mass, nipple discharge, skin change or tenderness  Abdominal:      General: Abdomen is flat  There is no distension  Palpations: There is no mass  Tenderness: There is no abdominal tenderness  There is no guarding or rebound  Genitourinary:     General: Normal vulva  Comments:   There is menstrual  Bleeding present  the cervix is closed and nulliparous  The IUD string is present  Lymphadenopathy:      Cervical: No cervical adenopathy  Skin:     General: Skin is warm and dry  Neurological:      Mental Status: She is alert and oriented to person, place, and time  Psychiatric:         Mood and Affect: Mood normal          Behavior: Behavior normal          Thought Content: Thought content normal          Judgment: Judgment normal            Iud removal    Date/Time: 2/3/2021 10:21 PM  Performed by: Nataliya Ybarra MD  Authorized by: Nataliya Ybarra MD   Universal Protocol:  Consent: Verbal consent obtained  Written consent obtained  Risks and benefits: risks, benefits and alternatives were discussed  Consent given by: patient  Patient understanding: patient states understanding of the procedure being performed  Patient consent: the patient's understanding of the procedure matches consent given  Procedure consent: procedure consent matches procedure scheduled  Patient identity confirmed: verbally with patient      Procedure:     Removed with no complications: yes      Other reason for removal:   possible association with weight gain and getting  in May  Comments: The IUD was easily removed and intact  It was cleaned off and placed in a bio has her bag  This was given to [facility] just in case it needed to be analyzed in the future

## 2021-07-21 NOTE — PROGRESS NOTES
Assessment/Plan:  Normal gynecologic exam  RTO 1 yr  BCM- Condoms, begin PNV +  mcg now [NTD discussed]  Unexplained weight gain- normal hormonal studies  Recommend visit to bariatric medical physician for evaluation, if that failed an endocrinologist  Ann Soares removed 2/21 hoping it was related  She actually lost 10 lb since then  Normal TSH, FSH, LH, estradiol, vitamin-D, vitamin B12 via PCP  History of MURALI-2- annual Pap smears till '28, CoTested '18- Pap alone  Self breast exams monthly  BCM- Paragard  Ca 1,000 mg/d with Vit D  Exercise 3/wk-she does 5         Diagnoses and all orders for this visit:    Encounter for annual routine gynecological examination  -     Liquid-based pap, screening    History of cervical dysplasia  -     Liquid-based pap, screening    Screening for cervical cancer  -     Liquid-based pap, screening    Unexplained weight gain              Subjective:        Patient ID: Daren Bella is a 39 y o  female  Eliazar is here for her yearly evaluation  She is happy that she has lost 10 lb since the IUD was removed in February  Nothing else has changed  This was a ParaGard IUD  Currently they are using condoms and sometimes rhythm for contraception  Pregnancy attempts will begin in the fall  I recommended prenatal vitamins with extra folic acid beginning now  Neural tube defects were explained  Her menses are every 28 days lasting 4 days  The following portions of the patient's history were reviewed and updated as appropriate: She  has a past medical history of Allergic rhinitis (04/30/2015), Bell's palsy (02/04/2016), Generalized anxiety disorder (10/04/2012), Migraine (10/03/2014), Reactive airway disease (09/05/2013), and Shortness of breath (09/05/2013)    Patient Active Problem List    Diagnosis Date Noted    IUD (intrauterine device) in place 02/27/2019    Encounter for IUD insertion 01/28/2019    Contraceptive management 12/31/2018    Encounter for annual routine gynecological examination 2018    Screening for cervical cancer 2018    History of cervical dysplasia 2018   PMH:  Menarche 12  G0  R Bunionectomy '  Anorexia (I was not aware until today)  MURALI-2 cryosurgery , + HR HPV   Anxiety  Cold Sores  Obesity  Circumferential abdominoplasty following a 70 pound weight loss,   Hypothyroidism '      Lactose intolerant '15       Paragard IUD       Unexplained weight gain - present [ 146 '15 to 182  ]      Paragard removed   She  has a past surgical history that includes Abdominoplasty; Mouth surgery; and Bunionectomy  Her family history includes Breast cancer in her maternal aunt and paternal aunt; Cancer in her maternal grandfather; Diabetes in her paternal grandfather; Heart disease in her paternal grandfather; Lung cancer in her maternal aunt; Migraines in her paternal aunt; No Known Problems in her brother, father, mother, and sister  FH:  MGF- bladder cancer 78  PU- brain cancer,  62   PGF- MI 68, DM   MGM- TIA's  PA- Breast Ca 49  She  reports that she quit smoking about 14 years ago  She has never used smokeless tobacco  She reports current alcohol use  She reports that she does not use drugs  SH:  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 [place]  Former smoker who quit in '  Since  back with her boyfriend of 14 years who moved back from [place] [address] to conceive in the   Current Outpatient Medications   Medication Sig Dispense Refill    albuterol (VENTOLIN HFA) 90 mcg/act inhaler Inhale 2 puffs every 6 (six) hours as needed      Cholecalciferol (VITAMIN D3) 97793 units CAPS Take by mouth      Probiotic Product (PROBIOTIC PO) Take by mouth      rizatriptan (MAXALT-MLT) 10 MG disintegrating tablet Take 1 tablet by mouth daily as needed       No current facility-administered medications for this visit       Current Outpatient Medications on File Prior to Visit   Medication Sig    albuterol (VENTOLIN HFA) 90 mcg/act inhaler Inhale 2 puffs every 6 (six) hours as needed    Cholecalciferol (VITAMIN D3) 88924 units CAPS Take by mouth    Probiotic Product (PROBIOTIC PO) Take by mouth    rizatriptan (MAXALT-MLT) 10 MG disintegrating tablet Take 1 tablet by mouth daily as needed    [DISCONTINUED] ALPRAZolam (XANAX) 0 25 mg tablet Take 1 tablet by mouth every 8 (eight) hours as needed    [DISCONTINUED] amoxicillin-clavulanate (AUGMENTIN) 875-125 mg per tablet     [DISCONTINUED] intrauterine copper (PARAGARD) IUD 1 each by Intrauterine route once    [DISCONTINUED] levothyroxine 25 mcg tablet Take by mouth    [DISCONTINUED] nitrofurantoin (MACROBID) 100 mg capsule      No current facility-administered medications on file prior to visit  She is allergic to diphenhydramine       Review of Systems   Constitutional: Negative for activity change, appetite change, fatigue and unexpected weight change  Eyes: Negative for visual disturbance  Respiratory: Negative for cough, chest tightness, shortness of breath and wheezing  Cardiovascular: Negative for chest pain, palpitations and leg swelling  Breast: Patient denies tenderness, nipple discharge, masses, or erythema  Gastrointestinal: Negative for abdominal distention, abdominal pain, blood in stool, constipation, diarrhea, nausea and vomiting  Endocrine: Negative for cold intolerance and heat intolerance  Genitourinary: Negative for decreased urine volume, difficulty urinating, dyspareunia, dysuria, frequency, hematuria, menstrual problem, pelvic pain, urgency, vaginal bleeding, vaginal discharge and vaginal pain  Musculoskeletal: Negative for arthralgias  Skin: Negative for rash  Neurological: Negative for weakness, light-headedness, numbness and headaches  Hematological: Does not bruise/bleed easily  Psychiatric/Behavioral: Negative for agitation, behavioral problems and sleep disturbance  The patient is not nervous/anxious  Objective:    Vitals:    07/22/21 1050   BP: 102/60   Weight: 78 2 kg (172 lb 6 4 oz)            Physical Exam  Vitals and nursing note reviewed  Exam conducted with a chaperone present  Constitutional:       General: She is not in acute distress  Appearance: She is well-developed  HENT:      Head: Normocephalic and atraumatic  Eyes:      General: No scleral icterus  Right eye: No discharge  Left eye: No discharge  Extraocular Movements: Extraocular movements intact  Conjunctiva/sclera: Conjunctivae normal    Neck:      Thyroid: No thyromegaly  Trachea: No tracheal deviation  Cardiovascular:      Rate and Rhythm: Normal rate and regular rhythm  Heart sounds: Normal heart sounds  No murmur heard  Pulmonary:      Effort: Pulmonary effort is normal  No respiratory distress  Breath sounds: Normal breath sounds  No wheezing  Chest:      Breasts: Breasts are symmetrical          Right: No inverted nipple, mass, nipple discharge, skin change or tenderness  Left: No inverted nipple, mass, nipple discharge, skin change or tenderness  Abdominal:      General: Bowel sounds are normal  There is no distension  Palpations: Abdomen is soft  There is no mass  Tenderness: There is no abdominal tenderness  There is no right CVA tenderness or left CVA tenderness  Genitourinary:     General: Normal vulva  Exam position: Supine  Labia:         Right: No rash, tenderness or lesion  Left: No rash, tenderness or lesion  Vagina: Normal       Cervix: No cervical motion tenderness or discharge  Uterus: Not deviated, not enlarged and not tender  Adnexa:         Right: No mass, tenderness or fullness  Left: No mass, tenderness or fullness  Rectum: No external hemorrhoid  Comments: Urethral meatus within normal limits  Perineum within normal limits  Bladder well supported   Uterus is retroverted  Musculoskeletal:         General: Normal range of motion  Cervical back: Normal range of motion and neck supple  Lymphadenopathy:      Cervical: No cervical adenopathy  Skin:     General: Skin is warm and dry  Neurological:      Mental Status: She is alert and oriented to person, place, and time  Psychiatric:         Mood and Affect: Mood normal          Behavior: Behavior normal          Thought Content:  Thought content normal          Judgment: Judgment normal

## 2021-07-22 ENCOUNTER — ANNUAL EXAM (OUTPATIENT)
Dept: OBGYN CLINIC | Facility: CLINIC | Age: 36
End: 2021-07-22
Payer: COMMERCIAL

## 2021-07-22 VITALS — WEIGHT: 172.4 LBS | BODY MASS INDEX: 29.59 KG/M2 | DIASTOLIC BLOOD PRESSURE: 60 MMHG | SYSTOLIC BLOOD PRESSURE: 102 MMHG

## 2021-07-22 DIAGNOSIS — Z12.4 SCREENING FOR CERVICAL CANCER: ICD-10-CM

## 2021-07-22 DIAGNOSIS — Z01.419 ENCOUNTER FOR ANNUAL ROUTINE GYNECOLOGICAL EXAMINATION: Primary | ICD-10-CM

## 2021-07-22 DIAGNOSIS — Z87.410 HISTORY OF CERVICAL DYSPLASIA: ICD-10-CM

## 2021-07-22 DIAGNOSIS — R63.5 UNEXPLAINED WEIGHT GAIN: ICD-10-CM

## 2021-07-22 PROCEDURE — S0612 ANNUAL GYNECOLOGICAL EXAMINA: HCPCS | Performed by: OBSTETRICS & GYNECOLOGY

## 2021-07-22 PROCEDURE — G0145 SCR C/V CYTO,THINLAYER,RESCR: HCPCS | Performed by: OBSTETRICS & GYNECOLOGY

## 2021-07-29 LAB
LAB AP GYN PRIMARY INTERPRETATION: NORMAL
Lab: NORMAL

## 2022-01-20 ENCOUNTER — APPOINTMENT (OUTPATIENT)
Dept: LAB | Facility: CLINIC | Age: 37
End: 2022-01-20
Payer: COMMERCIAL

## 2022-01-20 DIAGNOSIS — R79.9 ABNORMAL BLOOD CHEMISTRY: ICD-10-CM

## 2022-01-20 DIAGNOSIS — E55.9 VITAMIN D DEFICIENCY: ICD-10-CM

## 2022-01-20 DIAGNOSIS — E34.9 ENDOCRINE PROBLEM: ICD-10-CM

## 2022-01-20 DIAGNOSIS — R68.82 DECREASED LIBIDO: ICD-10-CM

## 2022-01-20 DIAGNOSIS — R73.01 IMPAIRED FASTING GLUCOSE: ICD-10-CM

## 2022-01-20 DIAGNOSIS — D64.9 ANEMIA, UNSPECIFIED TYPE: ICD-10-CM

## 2022-01-20 DIAGNOSIS — R53.83 FATIGUE, UNSPECIFIED TYPE: ICD-10-CM

## 2022-01-20 DIAGNOSIS — E03.9 HYPOTHYROIDISM, UNSPECIFIED TYPE: ICD-10-CM

## 2022-01-20 DIAGNOSIS — E53.8 VITAMIN B 12 DEFICIENCY: ICD-10-CM

## 2022-01-20 DIAGNOSIS — K21.9 GASTROESOPHAGEAL REFLUX DISEASE WITHOUT ESOPHAGITIS: ICD-10-CM

## 2022-01-20 DIAGNOSIS — E88.81 METABOLIC SYNDROME: ICD-10-CM

## 2022-01-20 DIAGNOSIS — R63.4 LOSS OF WEIGHT: ICD-10-CM

## 2022-01-20 LAB
ANION GAP SERPL CALCULATED.3IONS-SCNC: 4 MMOL/L (ref 4–13)
BASOPHILS # BLD AUTO: 0.02 THOUSANDS/ΜL (ref 0–0.1)
BASOPHILS NFR BLD AUTO: 1 % (ref 0–1)
BUN SERPL-MCNC: 16 MG/DL (ref 5–25)
CALCIUM SERPL-MCNC: 8.6 MG/DL (ref 8.3–10.1)
CHLORIDE SERPL-SCNC: 109 MMOL/L (ref 100–108)
CO2 SERPL-SCNC: 26 MMOL/L (ref 21–32)
CREAT SERPL-MCNC: 0.82 MG/DL (ref 0.6–1.3)
EOSINOPHIL # BLD AUTO: 0.02 THOUSAND/ΜL (ref 0–0.61)
EOSINOPHIL NFR BLD AUTO: 1 % (ref 0–6)
ERYTHROCYTE [DISTWIDTH] IN BLOOD BY AUTOMATED COUNT: 13.1 % (ref 11.6–15.1)
FOLATE SERPL-MCNC: >20 NG/ML (ref 3.1–17.5)
FSH SERPL-ACNC: 3.3 MIU/ML
GFR SERPL CREATININE-BSD FRML MDRD: 92 ML/MIN/1.73SQ M
GLUCOSE P FAST SERPL-MCNC: 89 MG/DL (ref 65–99)
HCT VFR BLD AUTO: 35.8 % (ref 34.8–46.1)
HGB BLD-MCNC: 11.6 G/DL (ref 11.5–15.4)
IMM GRANULOCYTES # BLD AUTO: 0.02 THOUSAND/UL (ref 0–0.2)
IMM GRANULOCYTES NFR BLD AUTO: 1 % (ref 0–2)
INSULIN SERPL-ACNC: 5.5 MU/L (ref 3–25)
LYMPHOCYTES # BLD AUTO: 1.18 THOUSANDS/ΜL (ref 0.6–4.47)
LYMPHOCYTES NFR BLD AUTO: 30 % (ref 14–44)
MAGNESIUM SERPL-MCNC: 2.3 MG/DL (ref 1.6–2.6)
MCH RBC QN AUTO: 29.6 PG (ref 26.8–34.3)
MCHC RBC AUTO-ENTMCNC: 32.4 G/DL (ref 31.4–37.4)
MCV RBC AUTO: 91 FL (ref 82–98)
MONOCYTES # BLD AUTO: 0.45 THOUSAND/ΜL (ref 0.17–1.22)
MONOCYTES NFR BLD AUTO: 11 % (ref 4–12)
NEUTROPHILS # BLD AUTO: 2.29 THOUSANDS/ΜL (ref 1.85–7.62)
NEUTS SEG NFR BLD AUTO: 56 % (ref 43–75)
NRBC BLD AUTO-RTO: 0 /100 WBCS
PHOSPHATE SERPL-MCNC: 2.6 MG/DL (ref 2.7–4.5)
PLATELET # BLD AUTO: 326 THOUSANDS/UL (ref 149–390)
PMV BLD AUTO: 9.5 FL (ref 8.9–12.7)
POTASSIUM SERPL-SCNC: 3.9 MMOL/L (ref 3.5–5.3)
PROGEST SERPL-MCNC: 5.8 NG/ML
RBC # BLD AUTO: 3.92 MILLION/UL (ref 3.81–5.12)
SODIUM SERPL-SCNC: 139 MMOL/L (ref 136–145)
TIBC SERPL-MCNC: 396 UG/DL (ref 250–450)
TSH SERPL DL<=0.05 MIU/L-ACNC: 2.22 UIU/ML (ref 0.36–3.74)
VIT B12 SERPL-MCNC: 957 PG/ML (ref 100–900)
WBC # BLD AUTO: 3.98 THOUSAND/UL (ref 4.31–10.16)

## 2022-01-20 PROCEDURE — 86258 DGP ANTIBODY EACH IG CLASS: CPT

## 2022-01-20 PROCEDURE — 82607 VITAMIN B-12: CPT

## 2022-01-20 PROCEDURE — 84100 ASSAY OF PHOSPHORUS: CPT

## 2022-01-20 PROCEDURE — 86231 EMA EACH IG CLASS: CPT

## 2022-01-20 PROCEDURE — 36415 COLL VENOUS BLD VENIPUNCTURE: CPT

## 2022-01-20 PROCEDURE — 83550 IRON BINDING TEST: CPT

## 2022-01-20 PROCEDURE — 84443 ASSAY THYROID STIM HORMONE: CPT

## 2022-01-20 PROCEDURE — 84144 ASSAY OF PROGESTERONE: CPT

## 2022-01-20 PROCEDURE — 82542 COL CHROMOTOGRAPHY QUAL/QUAN: CPT

## 2022-01-20 PROCEDURE — 82746 ASSAY OF FOLIC ACID SERUM: CPT

## 2022-01-20 PROCEDURE — 83525 ASSAY OF INSULIN: CPT

## 2022-01-20 PROCEDURE — 82784 ASSAY IGA/IGD/IGG/IGM EACH: CPT

## 2022-01-20 PROCEDURE — 83789 MASS SPECTROMETRY QUAL/QUAN: CPT

## 2022-01-20 PROCEDURE — 86364 TISS TRNSGLTMNASE EA IG CLAS: CPT

## 2022-01-20 PROCEDURE — 83735 ASSAY OF MAGNESIUM: CPT

## 2022-01-20 PROCEDURE — 84402 ASSAY OF FREE TESTOSTERONE: CPT

## 2022-01-20 PROCEDURE — 85025 COMPLETE CBC W/AUTO DIFF WBC: CPT

## 2022-01-20 PROCEDURE — 80048 BASIC METABOLIC PNL TOTAL CA: CPT

## 2022-01-20 PROCEDURE — 83001 ASSAY OF GONADOTROPIN (FSH): CPT

## 2022-01-20 PROCEDURE — 84403 ASSAY OF TOTAL TESTOSTERONE: CPT

## 2022-01-21 LAB
ENDOMYSIUM IGA SER QL: NEGATIVE
GLIADIN PEPTIDE IGA SER-ACNC: 5 UNITS (ref 0–19)
GLIADIN PEPTIDE IGG SER-ACNC: 3 UNITS (ref 0–19)
IGA SERPL-MCNC: 122 MG/DL (ref 87–352)
TESTOST FREE SERPL-MCNC: 1 PG/ML (ref 0–4.2)
TESTOST SERPL-MCNC: 17 NG/DL (ref 8–60)
TTG IGA SER-ACNC: <2 U/ML (ref 0–3)
TTG IGG SER-ACNC: 6 U/ML (ref 0–5)

## 2022-01-25 LAB
IODINE SERPL-MCNC: 41.1 UG/L (ref 40–92)
UBIQUINONE10 SERPL-MCNC: 0.88 UG/ML (ref 0.37–2.2)

## 2022-05-02 ENCOUNTER — APPOINTMENT (OUTPATIENT)
Dept: LAB | Facility: HOSPITAL | Age: 37
End: 2022-05-02
Attending: PLASTIC SURGERY
Payer: COMMERCIAL

## 2022-05-02 DIAGNOSIS — Z01.812 PRE-OPERATIVE LABORATORY EXAMINATION: ICD-10-CM

## 2022-05-02 LAB
APTT PPP: 29 SECONDS (ref 23–37)
BASOPHILS # BLD AUTO: 0.04 THOUSANDS/ΜL (ref 0–0.1)
BASOPHILS NFR BLD AUTO: 1 % (ref 0–1)
EOSINOPHIL # BLD AUTO: 0.02 THOUSAND/ΜL (ref 0–0.61)
EOSINOPHIL NFR BLD AUTO: 0 % (ref 0–6)
ERYTHROCYTE [DISTWIDTH] IN BLOOD BY AUTOMATED COUNT: 14.6 % (ref 11.6–15.1)
HCT VFR BLD AUTO: 41.3 % (ref 34.8–46.1)
HGB BLD-MCNC: 13.2 G/DL (ref 11.5–15.4)
IMM GRANULOCYTES # BLD AUTO: 0.02 THOUSAND/UL (ref 0–0.2)
IMM GRANULOCYTES NFR BLD AUTO: 0 % (ref 0–2)
INR PPP: 1.01 (ref 0.84–1.19)
LYMPHOCYTES # BLD AUTO: 1.74 THOUSANDS/ΜL (ref 0.6–4.47)
LYMPHOCYTES NFR BLD AUTO: 27 % (ref 14–44)
MCH RBC QN AUTO: 27.7 PG (ref 26.8–34.3)
MCHC RBC AUTO-ENTMCNC: 32 G/DL (ref 31.4–37.4)
MCV RBC AUTO: 87 FL (ref 82–98)
MONOCYTES # BLD AUTO: 0.69 THOUSAND/ΜL (ref 0.17–1.22)
MONOCYTES NFR BLD AUTO: 11 % (ref 4–12)
NEUTROPHILS # BLD AUTO: 3.85 THOUSANDS/ΜL (ref 1.85–7.62)
NEUTS SEG NFR BLD AUTO: 61 % (ref 43–75)
NRBC BLD AUTO-RTO: 0 /100 WBCS
PLATELET # BLD AUTO: 309 THOUSANDS/UL (ref 149–390)
PMV BLD AUTO: 9.6 FL (ref 8.9–12.7)
PROTHROMBIN TIME: 12.9 SECONDS (ref 11.6–14.5)
RBC # BLD AUTO: 4.76 MILLION/UL (ref 3.81–5.12)
WBC # BLD AUTO: 6.36 THOUSAND/UL (ref 4.31–10.16)

## 2022-05-02 PROCEDURE — 85730 THROMBOPLASTIN TIME PARTIAL: CPT

## 2022-05-02 PROCEDURE — 85025 COMPLETE CBC W/AUTO DIFF WBC: CPT

## 2022-05-02 PROCEDURE — 85610 PROTHROMBIN TIME: CPT

## 2022-05-02 PROCEDURE — 36415 COLL VENOUS BLD VENIPUNCTURE: CPT

## 2022-05-05 RX ORDER — LEVOTHYROXINE SODIUM 0.05 MG/1
50 TABLET ORAL
COMMUNITY
Start: 2022-04-12

## 2022-05-05 RX ORDER — LIOTHYRONINE SODIUM 25 UG/1
TABLET ORAL
COMMUNITY
Start: 2022-04-12

## 2022-05-05 NOTE — PRE-PROCEDURE INSTRUCTIONS
Pre-Surgery Instructions:   Medication Instructions    albuterol (VENTOLIN HFA) 90 mcg/act inhaler Uses PRN- OK to take day of surgery    levothyroxine 50 mcg tablet Take day of surgery   liothyronine (CYTOMEL) 25 mcg tablet Take day of surgery   Probiotic Product (PROBIOTIC PO) Hold day of surgery   rizatriptan (MAXALT-MLT) 10 MG disintegrating tablet Uses PRN- DO NOT take day of surgery    You will receive a phone call from hospital for arrival time  Please call surgeons office if any changes in your condition  Wear easy on/off clothing; consider type of surgery;  Valuables, jewelry, piercing's please keep at home  **COVID-19  education/surgical guidelines  Updated covid    Visitation policy  Please: No contact lenses or eye make up, artificial eyelashes    Please secure transportation     Follow pre surgery showering or cleaning instructions as  Reviewed by nurse or surgeons office      Questions answered and concerns addressed

## 2022-05-11 NOTE — H&P
H&P Exam - Andria Gonzalez 40 y o  female MRN: [id number]    Unit/Bed#:  Encounter: [id number]    Assessment:  Bilateral upper eyelid blepharochalasis    Plan:  Bilateral upper eyelid functional blepharoplasty    History of Present Illness   This is a 80-year-old female with excess skin and fat of the upper eyelids that causes a visual field obstruction of over 30° patient has keratitis in the lateral folds of her eyes    Review of Systems   All other systems reviewed and are negative        Historical Information   Past Medical History:   Diagnosis Date    Allergic rhinitis 04/30/2015    Anxiety     Bell's palsy 02/04/2016    Generalized anxiety disorder 10/04/2012    Migraine 10/03/2014    PONV (postoperative nausea and vomiting)     Reactive airway disease 09/05/2013    Seasonal allergies     Shortness of breath 09/05/2013     Past Surgical History:   Procedure Laterality Date    ABDOMINOPLASTY      BUNIONECTOMY      MOUTH SURGERY      Tooth Extraction [place] Tooth     Social History   Social History     Substance and Sexual Activity   Alcohol Use Not Currently    Comment: social     Social History     Substance and Sexual Activity   Drug Use No     Social History     Tobacco Use   Smoking Status Former Smoker    Packs/day: 0 50    Years: 5 00    Pack years: 2 50    Quit date: 2007    Years since quitting: 15 3   Smokeless Tobacco Never Used     E-Cigarette Use: Never User     E-Cigarette/Vaping Substances    Nicotine No     THC No     CBD No     Flavoring No     Other No     Unknown No        Family History: non-contributory    Meds/Allergies   all medications and allergies reviewed  Allergies   Allergen Reactions    Diphenhydramine Hallucinations       Objective   First Vitals:        Current Vitals:        No intake or output data in the 24 hours ending 05/11/22 1707    Invasive Devices  Report    None                 Physical Exam examination of the head ears eyes nose and throat is within normal limits bilateral upper eyelid show excessive upper eyelid skin weighting down the eyelids causing visual field obstruction    Heart sounds S1-S2 heard no murmurs or gallops lungs clear abdomen soft extremities are within normal limits    Lab Results:   Imaging:   EKG, Pathology, and Other Studies:     Code Status: No Order  Advance Directive and Living Will:      Power of :    POLST:      Counseling / Coordination of Care:   None

## 2022-05-12 ENCOUNTER — HOSPITAL ENCOUNTER (OUTPATIENT)
Facility: HOSPITAL | Age: 37
Setting detail: OUTPATIENT SURGERY
Discharge: HOME | End: 2022-05-12
Attending: PLASTIC SURGERY | Admitting: PLASTIC SURGERY
Payer: COMMERCIAL

## 2022-05-12 VITALS
BODY MASS INDEX: 28.17 KG/M2 | SYSTOLIC BLOOD PRESSURE: 122 MMHG | HEART RATE: 88 BPM | OXYGEN SATURATION: 98 % | HEIGHT: 64 IN | WEIGHT: 165 LBS | RESPIRATION RATE: 20 BRPM | TEMPERATURE: 98.3 F | DIASTOLIC BLOOD PRESSURE: 82 MMHG

## 2022-05-12 LAB
EXT PREGNANCY TEST URINE: NEGATIVE
EXT. CONTROL: NORMAL

## 2022-05-12 PROCEDURE — 81025 URINE PREGNANCY TEST: CPT | Performed by: PLASTIC SURGERY

## 2022-05-12 RX ORDER — CEFAZOLIN SODIUM 1 G/50ML
1000 SOLUTION INTRAVENOUS ONCE
Status: DISCONTINUED | OUTPATIENT
Start: 2022-05-12 | End: 2022-05-12 | Stop reason: HOSPADM

## 2022-05-12 RX ORDER — SODIUM CHLORIDE, SODIUM LACTATE, POTASSIUM CHLORIDE, CALCIUM CHLORIDE 600; 310; 30; 20 MG/100ML; MG/100ML; MG/100ML; MG/100ML
125 INJECTION, SOLUTION INTRAVENOUS CONTINUOUS
Status: DISCONTINUED | OUTPATIENT
Start: 2022-05-12 | End: 2022-05-12 | Stop reason: HOSPADM

## 2022-05-12 RX ADMIN — SODIUM CHLORIDE, SODIUM LACTATE, POTASSIUM CHLORIDE, AND CALCIUM CHLORIDE 125 ML/HR: .6; .31; .03; .02 INJECTION, SOLUTION INTRAVENOUS at 06:25

## 2022-06-01 ENCOUNTER — ANESTHESIA EVENT (OUTPATIENT)
Dept: PERIOP | Facility: HOSPITAL | Age: 37
End: 2022-06-01
Payer: COMMERCIAL

## 2022-06-01 NOTE — PRE-PROCEDURE INSTRUCTIONS
Pre-Surgery Instructions:   Medication Instructions    albuterol (PROVENTIL HFA,VENTOLIN HFA) 90 mcg/act inhaler Uses PRN- OK to take day of surgery    fluticasone (FLONASE) 50 mcg/act nasal spray Take day of surgery   levothyroxine 50 mcg tablet Take day of surgery   liothyronine (CYTOMEL) 25 mcg tablet Take day of surgery   Probiotic Product (PROBIOTIC PO) Hold day of surgery   rizatriptan (MAXALT-MLT) 10 MG disintegrating tablet Uses PRN- OK to take day of surgery    VITAMIN D-VITAMIN K PO Hold day of surgery  Reviewed pre op medicine and showering instructions with patient via phone call, verbalizes understanding  Advised patient to stop taking non prescribed vitamins, herbal meds and NSAID's as of 6/1 but may take Tylenol products if needed  Advised to take DOS medicine with a small sip water  Advised NPO after MN prior to surgery and surgical services will call (6/1) with scheduled time of hospital arrival        Pt verbalized understanding of all instructions

## 2022-06-01 NOTE — H&P
H&P Exam - Chacha Interiano 40 y o  female MRN: [id number]    Unit/Bed#:  Encounter: [id number]    Assessment:  Bilateral upper eyelid blepharochalasis    Plan:  Bilateral upper eyelid functional blepharoplasty    History of Present Illness   This is a 29-year-old female with excessive upper eyelid skin weighting down her eyelids causing visual field obstruction    Review of Systems   All other systems reviewed and are negative        Historical Information   Past Medical History:   Diagnosis Date    Allergic rhinitis 04/30/2015    Anxiety     Bell's palsy 02/04/2016    Generalized anxiety disorder 10/04/2012    Migraine 10/03/2014    PONV (postoperative nausea and vomiting)     Reactive airway disease 09/05/2013    Seasonal allergies     Shortness of breath 09/05/2013     Past Surgical History:   Procedure Laterality Date    ABDOMINOPLASTY      BUNIONECTOMY      MOUTH SURGERY      Tooth Extraction Widsom Tooth     Social History   Social History     Substance and Sexual Activity   Alcohol Use Not Currently    Comment: social     Social History     Substance and Sexual Activity   Drug Use No     Social History     Tobacco Use   Smoking Status Former Smoker    Packs/day: 0 50    Years: 5 00    Pack years: 2 50    Quit date: 2007    Years since quitting: 15 4   Smokeless Tobacco Never Used     E-Cigarette Use: Never User     E-Cigarette/Vaping Substances    Nicotine No     THC No     CBD No     Flavoring No     Other No     Unknown No        Family History: non-contributory    Meds/Allergies   all medications and allergies reviewed  Allergies   Allergen Reactions    Diphenhydramine Hallucinations       Objective   First Vitals:        Current Vitals:        No intake or output data in the 24 hours ending 06/01/22 1429    Invasive Devices  Report    None                 Physical Exam examination of the head ears eyes nose and throat is within normal limits heart sounds S1-S2 heard no murmurs or gallops lungs clear abdomen soft extremities are within normal limits  There is a visual field obstruction from the upper eyelids weighting down the eyelids causing visual field obstruction greater than 30°      Lab Results:   Imaging:   EKG, Pathology, and Other Studies:     Code Status: No Order  Advance Directive and Living Will:      Power of :    POLST:      Counseling / Coordination of Care:   None

## 2022-06-02 ENCOUNTER — HOSPITAL ENCOUNTER (EMERGENCY)
Facility: HOSPITAL | Age: 37
Discharge: HOME | End: 2022-06-02
Attending: EMERGENCY MEDICINE
Payer: COMMERCIAL

## 2022-06-02 ENCOUNTER — HOSPITAL ENCOUNTER (OUTPATIENT)
Facility: HOSPITAL | Age: 37
Setting detail: OUTPATIENT SURGERY
Discharge: HOME | End: 2022-06-02
Attending: PLASTIC SURGERY | Admitting: PLASTIC SURGERY
Payer: COMMERCIAL

## 2022-06-02 ENCOUNTER — ANESTHESIA (OUTPATIENT)
Dept: PERIOP | Facility: HOSPITAL | Age: 37
End: 2022-06-02
Payer: COMMERCIAL

## 2022-06-02 VITALS
BODY MASS INDEX: 28.17 KG/M2 | OXYGEN SATURATION: 97 % | HEIGHT: 64 IN | DIASTOLIC BLOOD PRESSURE: 55 MMHG | HEART RATE: 81 BPM | SYSTOLIC BLOOD PRESSURE: 97 MMHG | TEMPERATURE: 96.4 F | RESPIRATION RATE: 18 BRPM | WEIGHT: 165 LBS

## 2022-06-02 VITALS
BODY MASS INDEX: 33.44 KG/M2 | TEMPERATURE: 97.8 F | DIASTOLIC BLOOD PRESSURE: 70 MMHG | WEIGHT: 194.8 LBS | HEART RATE: 94 BPM | OXYGEN SATURATION: 96 % | SYSTOLIC BLOOD PRESSURE: 127 MMHG | RESPIRATION RATE: 16 BRPM

## 2022-06-02 DIAGNOSIS — R10.31 GROIN PAIN, RIGHT: Primary | ICD-10-CM

## 2022-06-02 PROBLEM — R11.2 PONV (POSTOPERATIVE NAUSEA AND VOMITING): Status: ACTIVE | Noted: 2022-06-02

## 2022-06-02 PROBLEM — F41.9 ANXIETY: Status: ACTIVE | Noted: 2022-06-02

## 2022-06-02 PROBLEM — H02.34 BLEPHAROCHALASIS LEFT UPPER EYELID: Status: ACTIVE | Noted: 2022-06-02

## 2022-06-02 PROBLEM — J45.909 ASTHMA: Status: ACTIVE | Noted: 2022-06-02

## 2022-06-02 PROBLEM — H02.31 BLEPHAROCHALASIS RIGHT UPPER EYELID: Status: ACTIVE | Noted: 2022-06-02

## 2022-06-02 PROBLEM — Z98.890 PONV (POSTOPERATIVE NAUSEA AND VOMITING): Status: ACTIVE | Noted: 2022-06-02

## 2022-06-02 LAB
EXT PREGNANCY TEST URINE: NEGATIVE
EXT. CONTROL: NORMAL

## 2022-06-02 PROCEDURE — 99283 EMERGENCY DEPT VISIT LOW MDM: CPT

## 2022-06-02 PROCEDURE — 99284 EMERGENCY DEPT VISIT MOD MDM: CPT | Performed by: EMERGENCY MEDICINE

## 2022-06-02 PROCEDURE — 81025 URINE PREGNANCY TEST: CPT | Performed by: PLASTIC SURGERY

## 2022-06-02 RX ORDER — PROMETHAZINE HYDROCHLORIDE 25 MG/ML
12.5 INJECTION, SOLUTION INTRAMUSCULAR; INTRAVENOUS ONCE AS NEEDED
Status: DISCONTINUED | OUTPATIENT
Start: 2022-06-02 | End: 2022-06-02 | Stop reason: HOSPADM

## 2022-06-02 RX ORDER — DEXAMETHASONE SODIUM PHOSPHATE 10 MG/ML
INJECTION, SOLUTION INTRAMUSCULAR; INTRAVENOUS AS NEEDED
Status: DISCONTINUED | OUTPATIENT
Start: 2022-06-02 | End: 2022-06-02

## 2022-06-02 RX ORDER — KETAMINE HCL IN NACL, ISO-OSM 100MG/10ML
SYRINGE (ML) INJECTION AS NEEDED
Status: DISCONTINUED | OUTPATIENT
Start: 2022-06-02 | End: 2022-06-02

## 2022-06-02 RX ORDER — NEOMYCIN SULFATE, POLYMYXIN B SULFATE, AND DEXAMETHASONE 3.5; 10000; 1 MG/G; [USP'U]/G; MG/G
OINTMENT OPHTHALMIC AS NEEDED
Status: DISCONTINUED | OUTPATIENT
Start: 2022-06-02 | End: 2022-06-02 | Stop reason: HOSPADM

## 2022-06-02 RX ORDER — MAGNESIUM HYDROXIDE 1200 MG/15ML
LIQUID ORAL AS NEEDED
Status: DISCONTINUED | OUTPATIENT
Start: 2022-06-02 | End: 2022-06-02 | Stop reason: HOSPADM

## 2022-06-02 RX ORDER — PROPOFOL 10 MG/ML
INJECTION, EMULSION INTRAVENOUS AS NEEDED
Status: DISCONTINUED | OUTPATIENT
Start: 2022-06-02 | End: 2022-06-02

## 2022-06-02 RX ORDER — FENTANYL CITRATE 50 UG/ML
INJECTION, SOLUTION INTRAMUSCULAR; INTRAVENOUS AS NEEDED
Status: DISCONTINUED | OUTPATIENT
Start: 2022-06-02 | End: 2022-06-02

## 2022-06-02 RX ORDER — ONDANSETRON 2 MG/ML
INJECTION INTRAMUSCULAR; INTRAVENOUS AS NEEDED
Status: DISCONTINUED | OUTPATIENT
Start: 2022-06-02 | End: 2022-06-02

## 2022-06-02 RX ORDER — LIDOCAINE HYDROCHLORIDE AND EPINEPHRINE 5; 5 MG/ML; UG/ML
INJECTION, SOLUTION INFILTRATION; PERINEURAL AS NEEDED
Status: DISCONTINUED | OUTPATIENT
Start: 2022-06-02 | End: 2022-06-02 | Stop reason: HOSPADM

## 2022-06-02 RX ORDER — HYDROMORPHONE HCL/PF 1 MG/ML
0.2 SYRINGE (ML) INJECTION
Status: DISCONTINUED | OUTPATIENT
Start: 2022-06-02 | End: 2022-06-02 | Stop reason: HOSPADM

## 2022-06-02 RX ORDER — PROPOFOL 10 MG/ML
INJECTION, EMULSION INTRAVENOUS CONTINUOUS PRN
Status: DISCONTINUED | OUTPATIENT
Start: 2022-06-02 | End: 2022-06-02

## 2022-06-02 RX ORDER — CEFAZOLIN SODIUM 1 G/50ML
1000 SOLUTION INTRAVENOUS ONCE
Status: COMPLETED | OUTPATIENT
Start: 2022-06-02 | End: 2022-06-02

## 2022-06-02 RX ORDER — EPHEDRINE SULFATE 50 MG/ML
INJECTION INTRAVENOUS AS NEEDED
Status: DISCONTINUED | OUTPATIENT
Start: 2022-06-02 | End: 2022-06-02

## 2022-06-02 RX ORDER — SODIUM CHLORIDE, SODIUM LACTATE, POTASSIUM CHLORIDE, CALCIUM CHLORIDE 600; 310; 30; 20 MG/100ML; MG/100ML; MG/100ML; MG/100ML
125 INJECTION, SOLUTION INTRAVENOUS CONTINUOUS
Status: DISCONTINUED | OUTPATIENT
Start: 2022-06-02 | End: 2022-06-02 | Stop reason: HOSPADM

## 2022-06-02 RX ORDER — BALANCED SALT SOLUTION 6.4; .75; .48; .3; 3.9; 1.7 MG/ML; MG/ML; MG/ML; MG/ML; MG/ML; MG/ML
SOLUTION OPHTHALMIC AS NEEDED
Status: DISCONTINUED | OUTPATIENT
Start: 2022-06-02 | End: 2022-06-02 | Stop reason: HOSPADM

## 2022-06-02 RX ORDER — LIDOCAINE HYDROCHLORIDE 10 MG/ML
INJECTION, SOLUTION EPIDURAL; INFILTRATION; INTRACAUDAL; PERINEURAL AS NEEDED
Status: DISCONTINUED | OUTPATIENT
Start: 2022-06-02 | End: 2022-06-02

## 2022-06-02 RX ORDER — MIDAZOLAM HYDROCHLORIDE 2 MG/2ML
INJECTION, SOLUTION INTRAMUSCULAR; INTRAVENOUS AS NEEDED
Status: DISCONTINUED | OUTPATIENT
Start: 2022-06-02 | End: 2022-06-02

## 2022-06-02 RX ORDER — FENTANYL CITRATE/PF 50 MCG/ML
50 SYRINGE (ML) INJECTION
Status: DISCONTINUED | OUTPATIENT
Start: 2022-06-02 | End: 2022-06-02 | Stop reason: HOSPADM

## 2022-06-02 RX ORDER — ONDANSETRON 2 MG/ML
4 INJECTION INTRAMUSCULAR; INTRAVENOUS ONCE AS NEEDED
Status: DISCONTINUED | OUTPATIENT
Start: 2022-06-02 | End: 2022-06-02 | Stop reason: HOSPADM

## 2022-06-02 RX ADMIN — LIDOCAINE HYDROCHLORIDE 50 MG: 10 INJECTION, SOLUTION EPIDURAL; INFILTRATION; INTRACAUDAL at 07:35

## 2022-06-02 RX ADMIN — FENTANYL CITRATE 25 MCG: 50 INJECTION, SOLUTION INTRAMUSCULAR; INTRAVENOUS at 08:29

## 2022-06-02 RX ADMIN — FENTANYL CITRATE 25 MCG: 50 INJECTION, SOLUTION INTRAMUSCULAR; INTRAVENOUS at 07:53

## 2022-06-02 RX ADMIN — PROPOFOL 20 MG: 10 INJECTION, EMULSION INTRAVENOUS at 08:29

## 2022-06-02 RX ADMIN — FENTANYL CITRATE 25 MCG: 50 INJECTION, SOLUTION INTRAMUSCULAR; INTRAVENOUS at 07:37

## 2022-06-02 RX ADMIN — EPHEDRINE SULFATE 5 MG: 50 INJECTION, SOLUTION INTRAVENOUS at 07:55

## 2022-06-02 RX ADMIN — PROPOFOL 30 MG: 10 INJECTION, EMULSION INTRAVENOUS at 08:08

## 2022-06-02 RX ADMIN — CEFAZOLIN SODIUM 1000 MG: 1 SOLUTION INTRAVENOUS at 07:27

## 2022-06-02 RX ADMIN — FENTANYL CITRATE 25 MCG: 50 INJECTION, SOLUTION INTRAMUSCULAR; INTRAVENOUS at 09:01

## 2022-06-02 RX ADMIN — Medication 10 MG: at 07:38

## 2022-06-02 RX ADMIN — DEXAMETHASONE SODIUM PHOSPHATE 10 MG: 10 INJECTION INTRAMUSCULAR; INTRAVENOUS at 07:41

## 2022-06-02 RX ADMIN — PROPOFOL 100 MCG/KG/MIN: 10 INJECTION, EMULSION INTRAVENOUS at 07:35

## 2022-06-02 RX ADMIN — MIDAZOLAM 2 MG: 1 INJECTION INTRAMUSCULAR; INTRAVENOUS at 07:28

## 2022-06-02 RX ADMIN — Medication 10 MG: at 07:53

## 2022-06-02 RX ADMIN — SODIUM CHLORIDE, SODIUM LACTATE, POTASSIUM CHLORIDE, AND CALCIUM CHLORIDE 125 ML/HR: .6; .31; .03; .02 INJECTION, SOLUTION INTRAVENOUS at 06:28

## 2022-06-02 RX ADMIN — Medication 20 MG: at 08:30

## 2022-06-02 RX ADMIN — ONDANSETRON 4 MG: 2 INJECTION INTRAMUSCULAR; INTRAVENOUS at 07:28

## 2022-06-02 RX ADMIN — Medication 10 MG: at 08:20

## 2022-06-02 NOTE — INTERVAL H&P NOTE
H&P reviewed  After examining the patient I find no changes in the patients condition since the H&P had been written      Vitals:    06/02/22 0617   BP: 123/62   Pulse: 84   Resp: 20   Temp: (!) 97 1 °F (36 2 °C)   SpO2: 97%

## 2022-06-02 NOTE — ANESTHESIA PREPROCEDURE EVALUATION
Procedure:  BLEPHAROPLASTY UPPER (Bilateral Eye)    Relevant Problems   ANESTHESIA   (+) PONV (postoperative nausea and vomiting)      NEURO/PSYCH   (+) Anxiety   (+) History of cervical dysplasia      PULMONARY   (+) Asthma       Latest Reference Range & Units 06/02/22 06:30   PREGNANCY TEST URINE Negative  Negative     Physical Exam    Airway    Mallampati score: II  TM Distance: >3 FB  Neck ROM: full     Dental   No notable dental hx     Cardiovascular      Pulmonary      Other Findings        Anesthesia Plan  ASA Score- 2     Anesthesia Type- IV sedation with anesthesia with ASA Monitors  Additional Monitors:   Airway Plan:           Plan Factors-Exercise tolerance (METS): >4 METS  Chart reviewed  Existing labs reviewed  Patient summary reviewed  Patient is not a current smoker  Patient did not smoke on day of surgery  Induction- intravenous  Postoperative Plan- Plan for postoperative opioid use  Informed Consent- Anesthetic plan and risks discussed with patient  I personally reviewed this patient with the CRNA  Discussed and agreed on the Anesthesia Plan with the CRNA  Pato Crawford

## 2022-06-02 NOTE — ANESTHESIA POSTPROCEDURE EVALUATION
Post-Op Assessment Note    CV Status:  Stable  Pain Score: 0    Pain management: adequate     Mental Status:  Alert and awake   Hydration Status:  Euvolemic   PONV Controlled:  Controlled   Airway Patency:  Patent      Post Op Vitals Reviewed: Yes      Staff: CRNA         No complications documented      /65 (06/02/22 0907)    Temp 97 5 °F (36 4 °C) (06/02/22 0907)    Pulse 81 (06/02/22 0907)   Resp 20 (06/02/22 0907)    SpO2 96 % (06/02/22 0907)

## 2022-06-02 NOTE — OP NOTE
OPERATIVE REPORT  PATIENT NAME: Jet Worthy    :  1985  MRN: [id number]  Pt Location:  OR ROOM 07    SURGERY DATE: 2022    Surgeon(s) and Role:     * Nitza Tafoya MD - Primary    Preop Diagnosis:  Blepharochalasis right upper eyelid [H02 31]  Blepharochalasis left upper eyelid [H02 34]    Post-Op Diagnosis Codes: * Blepharochalasis right upper eyelid [H02 31]     * Blepharochalasis left upper eyelid [H02 34]    Procedure(s) (LRB):  BLEPHAROPLASTY UPPER (Bilateral)    Specimen(s):  * No specimens in log *    Estimated Blood Loss:   5 mL    Drains:  * No LDAs found *    Anesthesia Type:   IV Sedation with Anesthesia    Operative Indications:  Blepharochalasis right upper eyelid [H02 31]  Blepharochalasis left upper eyelid [H02 34]      Operative Findings:  Excess skin and fat of upper eyelids    Complications:   None    Procedure and Technique:  Patient was draped prepped in normal sterile fashion given IV sedation  Patient was marked for excision of excessive upper eyelid skin  Patient was injected with local anesthesia  The excessive skin was removed  A 3 mm strip of orbicularis oculi was removed  Hemostasis was achieved electrocautery  The upper eyelids were defatted hemostasis achieved electrocautery  Wounds were irrigated and closed with a running 6 0 nylon suture  Identical procedure was performed on both sides    The eyes were flushed with balanced salt   I was present for the entire procedure    Patient Disposition:  PACU       SIGNATURE: Nitza Tafoya MD  DATE: 2022  TIME: 11:54 AM

## 2022-06-15 NOTE — ED PROVIDER NOTES
History  Chief Complaint   Patient presents with    Groin Pain     Patient had bilateral eye surgery this morning and was discharged to home, took a nap and woke up at 1630 hours with right groin pain, pain is not any worse than when she woke up, but is a persistant pain  Took Tylenol before that for a headache, but nothing for the groin pain  Patient is a 61-year-old female who presents with an acute onset of right groin pain  Had b/l blephoplasty done today  Dc'd without any issues  Woke up from nap with right non radiating groin pain  No swelling  No recent travel  No ocps  Called surgeon, sent in for eval, ?clot  No cp, sob  No f/s/c  Prior to Admission Medications   Prescriptions Last Dose Informant Patient Reported? Taking?    Probiotic Product (PROBIOTIC PO)  Self Yes No   Sig: Take by mouth in the morning     VITAMIN D-VITAMIN K PO   Yes No   Sig: Take by mouth   albuterol (PROVENTIL HFA,VENTOLIN HFA) 90 mcg/act inhaler  Self Yes No   Sig: Inhale 2 puffs every 6 (six) hours as needed   fluticasone (FLONASE) 50 mcg/act nasal spray   No No   Si spray into each nostril 2 (two) times a day   levothyroxine 50 mcg tablet   Yes No   Sig: Take 50 mcg by mouth daily before breakfast Take on an empty stomach   liothyronine (CYTOMEL) 25 mcg tablet   Yes No   Sig: TAKE ONE-HALF TABLET BY MOUTH EVERY DAY ON AN EMPTY STOMACH   rizatriptan (MAXALT-MLT) 10 MG disintegrating tablet  Self Yes No   Sig: Take 1 tablet by mouth daily as needed      Facility-Administered Medications: None       Past Medical History:   Diagnosis Date    Allergic rhinitis 2015    Anxiety     Bell's palsy 2016    Generalized anxiety disorder 10/04/2012    Migraine 10/03/2014    PONV (postoperative nausea and vomiting)     Reactive airway disease 2013    Seasonal allergies     Shortness of breath 2013       Past Surgical History:   Procedure Laterality Date    ABDOMINOPLASTY      BUNIONECTOMY      MOUTH SURGERY      Tooth Extraction Widsom Tooth    CO REV UPPER EYELID W EXCESS SKIN Bilateral 6/2/2022    Procedure: BLEPHAROPLASTY UPPER;  Surgeon: Vanessa Martinez MD;  Location: [address] [place] [address] OR;  Service: Plastics       Family History   Problem Relation Age of Onset    No Known Problems Mother     No Known Problems Father     Cancer Maternal Grandfather     Diabetes Paternal Grandfather         Diabetes Mellitus    Heart disease Paternal Grandfather     Migraines Paternal Aunt     Breast cancer Paternal Aunt     No Known Problems Sister     No Known Problems Brother     Breast cancer Maternal Aunt     Lung cancer Maternal Aunt     Cancer Maternal Aunt      I have reviewed and agree with the history as documented  E-Cigarette/Vaping    E-Cigarette Use Never User      E-Cigarette/Vaping Substances    Nicotine No     THC No     CBD No     Flavoring No     Other No     Unknown No      Social History     Tobacco Use    Smoking status: Former Smoker     Packs/day: 0 50     Years: 5 00     Pack years: 2 50     Quit date: 2007     Years since quitting: 15 4    Smokeless tobacco: Never Used   Vaping Use    Vaping Use: Never used   Substance Use Topics    Alcohol use: Not Currently     Comment: social    Drug use: No       Review of Systems   Constitutional: Negative  HENT: Negative  Eyes: Negative  Respiratory: Negative  Cardiovascular: Negative  Gastrointestinal: Negative  Groin pain  Endocrine: Negative  Genitourinary: Negative  Musculoskeletal: Negative  Skin: Negative  Allergic/Immunologic: Negative  Neurological: Negative  Hematological: Negative  Psychiatric/Behavioral: Negative  All other systems reviewed and are negative  Physical Exam  Physical Exam  Vitals and nursing note reviewed  Constitutional:       Appearance: Normal appearance  She is normal weight     HENT:      Mouth/Throat:      Mouth: Mucous membranes are moist       Pharynx: Oropharynx is clear  Eyes:      Comments: Post op with no drainage, erythema  fluctuance   Cardiovascular:      Rate and Rhythm: Normal rate and regular rhythm  Pulses: Normal pulses  Heart sounds: Normal heart sounds  Pulmonary:      Effort: Pulmonary effort is normal       Breath sounds: Normal breath sounds  Abdominal:      General: Bowel sounds are normal       Palpations: Abdomen is soft  Comments: Mild ttp in right groin  No swelling     Musculoskeletal:         General: No swelling, tenderness or deformity  Normal range of motion  Cervical back: Normal range of motion and neck supple  Right lower leg: No edema  Left lower leg: No edema  Comments: No calf ttp, no swelling  No palpable cord  Skin:     General: Skin is warm and dry  Capillary Refill: Capillary refill takes less than 2 seconds  Neurological:      General: No focal deficit present  Mental Status: She is alert and oriented to person, place, and time  Psychiatric:         Mood and Affect: Mood normal          Behavior: Behavior normal          Vital Signs  ED Triage Vitals [06/02/22 2143]   Temperature Pulse Respirations Blood Pressure SpO2   97 8 °F (36 6 °C) 94 16 127/70 96 %      Temp Source Heart Rate Source Patient Position - Orthostatic VS BP Location FiO2 (%)   Tympanic Monitor Sitting Right arm --      Pain Score       4           Vitals:    06/02/22 2143   BP: 127/70   Pulse: 94   Patient Position - Orthostatic VS: Sitting         Visual Acuity      ED Medications  Medications - No data to display    Diagnostic Studies  Results Reviewed     None                 No orders to display              Procedures  Procedures         ED Course  ED Course as of 06/15/22 1119   Thu Jun 02, 2022 2159 No CVC available in network  Bedside scan by me  Compressable veins in right leg                                                 MDM    Disposition  Final diagnoses: Groin pain, right     Time reflects when diagnosis was documented in both MDM as applicable and the Disposition within this note     Time User Action Codes Description Comment    6/2/2022 10:00 PM Rayna Chacon Matt [R10 [id number]] Groin pain, right       ED Disposition     ED Disposition   Discharge    Condition   Stable    Date/Time   Thu Jun 2, 2022 10:00 PM    Comment   Erika Miguel discharge to home/self care  Follow-up Information     Follow up With Specialties Details Why [facility],  Family Medicine   [address] [facility]  [address]  [address] [place] [address]  [phone]            Discharge Medication List as of 6/2/2022 10:00 PM      CONTINUE these medications which have NOT CHANGED    Details   albuterol (PROVENTIL HFA,VENTOLIN HFA) 90 mcg/act inhaler Inhale 2 puffs every 6 (six) hours as needed, Starting Thu 9/5/2013, Historical Med      fluticasone (FLONASE) 50 mcg/act nasal spray 1 spray into each nostril 2 (two) times a day, Starting Thu 1/13/2022, Normal      levothyroxine 50 mcg tablet Take 50 mcg by mouth daily before breakfast Take on an empty stomach, Starting Tue 4/12/2022, Historical Med      liothyronine (CYTOMEL) 25 mcg tablet TAKE ONE-HALF TABLET BY MOUTH EVERY DAY ON AN EMPTY STOMACH, Historical Med      Probiotic Product (PROBIOTIC PO) Take by mouth in the morning  , Historical Med      rizatriptan (MAXALT-MLT) 10 MG disintegrating tablet Take 1 tablet by mouth daily as needed, Starting Fri 10/3/2014, Historical Med      VITAMIN D-VITAMIN K PO Take by mouth, Historical Med             No discharge procedures on file      PDMP Review     None          ED Provider  Electronically Signed by           Jessica Merida MD  06/15/22 3004

## 2022-09-27 ENCOUNTER — ANNUAL EXAM (OUTPATIENT)
Dept: OBGYN CLINIC | Facility: CLINIC | Age: 37
End: 2022-09-27
Payer: COMMERCIAL

## 2022-09-27 VITALS
DIASTOLIC BLOOD PRESSURE: 70 MMHG | SYSTOLIC BLOOD PRESSURE: 110 MMHG | HEIGHT: 64 IN | WEIGHT: 181 LBS | BODY MASS INDEX: 30.9 KG/M2

## 2022-09-27 DIAGNOSIS — Z01.419 ENCOUNTER FOR WELL WOMAN EXAM WITH ROUTINE GYNECOLOGICAL EXAM: Primary | ICD-10-CM

## 2022-09-27 DIAGNOSIS — L02.92 BOIL: ICD-10-CM

## 2022-09-27 DIAGNOSIS — Z78.9 ATTEMPTING TO CONCEIVE: ICD-10-CM

## 2022-09-27 PROBLEM — Z30.430 ENCOUNTER FOR IUD INSERTION: Status: RESOLVED | Noted: 2019-01-28 | Resolved: 2022-09-27

## 2022-09-27 PROBLEM — Z97.5 IUD (INTRAUTERINE DEVICE) IN PLACE: Status: RESOLVED | Noted: 2019-02-27 | Resolved: 2022-09-27

## 2022-09-27 PROBLEM — Z30.9 CONTRACEPTIVE MANAGEMENT: Status: RESOLVED | Noted: 2018-12-31 | Resolved: 2022-09-27

## 2022-09-27 PROCEDURE — G0145 SCR C/V CYTO,THINLAYER,RESCR: HCPCS | Performed by: OBSTETRICS & GYNECOLOGY

## 2022-09-27 PROCEDURE — S0612 ANNUAL GYNECOLOGICAL EXAMINA: HCPCS | Performed by: OBSTETRICS & GYNECOLOGY

## 2022-09-27 PROCEDURE — G0476 HPV COMBO ASSAY CA SCREEN: HCPCS | Performed by: OBSTETRICS & GYNECOLOGY

## 2022-09-27 RX ORDER — CLINDAMYCIN PHOSPHATE 10 MG/G
GEL TOPICAL 2 TIMES DAILY
Qty: 30 G | Refills: 0 | Status: SHIPPED | OUTPATIENT
Start: 2022-09-27

## 2022-09-27 NOTE — PROGRESS NOTES
[facility] [address], [address], [address] [place] [address], [facility] Renny Nixonisaias    ASSESSMENT/PLAN: Ashu Fernando is a 40 y o  Peggi Kee who presents for annual gynecologic exam     Encounter for routine gynecologic examination  - Routine well woman exam completed today  - Cervical Cancer Screening: Current ASCCP Guidelines reviewed  Last Pap: 09/27/2022  Next Pap Due: pap done today, h/o dysplasia  - Contraceptive counseling discussed  Current contraception: none     Additional problems addressed during this visit:  1  Encounter for well woman exam with routine gynecological exam  -     Liquid-based pap, screening    2  Attempting to conceive  -     MTHFR mutation; Future  -     Ambulatory Referral to Maternal Fetal Medicine; Future; Expected date: 09/28/2022    3  Boil  -     clindamycin (CLINDAGEL) 1 % gel; Apply topically 2 (two) times a day      CC:  Annual Gynecologic Examination    HPI: Ashu Fernando is a 40 y o  Pegtimothy Kee who presents for annual gynecologic examination  HPI  Patient interested in conceiving the near future  We discussed prenatal vitamins, preconception counseling and genetic testing,     The following portions of the patient's history were reviewed and updated as appropriate: allergies, current medications, past family history, past medical history, obstetric history, gynecologic history, past social history, past surgical history and problem list     Review of Systems   Constitutional: Negative  HENT: Negative  Eyes: Negative  Respiratory: Negative  Cardiovascular: Negative  Gastrointestinal: Negative  Genitourinary: Negative  Musculoskeletal: Negative  All other systems reviewed and are negative  Objective:  /70 (BP Location: Right arm, Patient Position: Sitting)   Ht 5' 4" (1 626 m)   Wt 82 1 kg (181 lb)   LMP 08/30/2022   BMI 31 07 kg/m²    Physical Exam  Vitals reviewed  Constitutional:       General: She is not in acute distress  Appearance: She is well-developed  HENT:      Head: Normocephalic and atraumatic  Nose: Nose normal    Cardiovascular:      Rate and Rhythm: Normal rate  Pulmonary:      Effort: Pulmonary effort is normal  No respiratory distress  Chest:   Breasts: Breasts are symmetrical       Right: Normal  No mass, nipple discharge, skin change, tenderness, axillary adenopathy or supraclavicular adenopathy  Left: Normal  No mass, nipple discharge, skin change, tenderness, axillary adenopathy or supraclavicular adenopathy  Abdominal:      General: There is no distension  Palpations: Abdomen is soft  There is no mass  Tenderness: There is no abdominal tenderness  There is no guarding or rebound  Genitourinary:     General: Normal vulva  Exam position: Lithotomy position  Labia:         Right: No lesion  Left: No lesion  Urethra: No prolapse (urethral meatus normal)  Vagina: Normal  No vaginal discharge, erythema or bleeding  Cervix: Normal       Uterus: Normal        Adnexa: Right adnexa normal and left adnexa normal    Musculoskeletal:         General: Normal range of motion  Cervical back: Normal range of motion  Lymphadenopathy:      Upper Body:      Right upper body: No supraclavicular, axillary or pectoral adenopathy  Left upper body: No supraclavicular, axillary or pectoral adenopathy  Lower Body: No right inguinal adenopathy  No left inguinal adenopathy  Skin:     General: Skin is warm and dry  Neurological:      Mental Status: She is alert and oriented to person, place, and time  Psychiatric:         Behavior: Behavior normal          Thought Content:  Thought content normal          Judgment: Judgment normal

## 2022-10-07 LAB
LAB AP GYN PRIMARY INTERPRETATION: NORMAL
Lab: NORMAL

## 2022-10-11 ENCOUNTER — TELEMEDICINE (OUTPATIENT)
Dept: PERINATAL CARE | Facility: CLINIC | Age: 37
End: 2022-10-11

## 2022-10-11 DIAGNOSIS — Z31.5 ENCOUNTER FOR PROCREATIVE GENETIC COUNSELING: ICD-10-CM

## 2022-10-11 DIAGNOSIS — Z31.430 ENCOUNTER OF FEMALE FOR TESTING FOR GENETIC DISEASE CARRIER STATUS FOR PROCREATIVE MANAGEMENT: Primary | ICD-10-CM

## 2022-10-11 DIAGNOSIS — Z78.9 ATTEMPTING TO CONCEIVE: ICD-10-CM

## 2022-10-11 PROCEDURE — NC001 PR NO CHARGE: Performed by: GENETIC COUNSELOR, MS

## 2022-10-11 NOTE — PROGRESS NOTES
Preconception Genetic Counseling Note    Appointment Date:  10/11/2022  Referred By: Gail Yen MD  YOB: 1985  Partner:  Clemichelle Valdez  Indication for Visit:  preconception counseling  Pregnancy History:   Estimated Date of Delivery: NA  Estimated Gestational Age: NA      Virtual Regular Visit    Verification of patient location:    Patient is located in the following state in which I hold an active license [place]      Assessment/Plan:    Problem List Items Addressed This Visit    None     Visit Diagnoses     Encounter of female for testing for genetic disease carrier status for procreative management    -  Primary    Attempting to conceive        Encounter for procreative genetic counseling                   Reason for visit is   Chief Complaint   Patient presents with   • Virtual Regular Visit        Encounter provider Homero Andrew    Provider located at [address] [place] [id number] [phone]      Recent Visits  No visits were found meeting these conditions  Showing recent visits within past 7 days and meeting all other requirements  Future Appointments  No visits were found meeting these conditions  Showing future appointments within next 150 days and meeting all other requirements       The patient was identified by name and date of birth  Jaylin Conway was informed that this is a telemedicine visit and that the visit is being conducted through [facility] and patient was informed this is a secure, HIPAA-complaint platform  She agrees to proceed     My office door was closed  No one else was in the room  She acknowledged consent and understanding of privacy and security of the video platform  The patient has agreed to participate and understands they can discontinue the visit at any time  Trent Opitz is a 40 y o  female who presented for preconception genetic counseling    She stated that she knows of a couple who were identified to be carriers of spinal muscular atrophy and was interested in screening for that and any other conditions  Patient reports being of [place]e/[place] descent and that her  is of / descent  She denies either of them having known Ashkenazi Evangelical ancestry  The benefits and limitations of Cystic fibrosis (CF), Spinal muscular atrophy (SMA), and hemoglobinopathy carrier screening was discussed  We reviewed the option of expanded carrier screening  We discussed that the panels test for carrier status for up to 500 autosomal recessive and X-linked diseases  After reviewing the benefits and limitations of expanded carrier screening Onofre Ernandez elected to pursue the AC standard of care  She may opt for a larger expanded panel but would like to further discuss the option with her   A [employer] message with the available options will be sent to her and she will notify our office with what carrier screening she would like to pursue  Histories for the patient and her partner's family were taken during our session and was noncontributory  The family history was not significant for genetic diseases or disorders, intellectual disability, birth defects, fetal loss, or consanguinity  As this was a preconception consult we also discussed maternal age related risk for aneuploidies  The risk of Down syndrome at age 45 at delivery is 1/166 and the risk for any chromosomal abnormality at this age is 1/105  The differences between full chromosome aneuploidies and copy number variants (microdeletions and microduplications) was discussed  We discussed that copy number variants occur in about 0 4% of all pregnancies  We reviewed the benefits and limitations of maternal serum screening for aneuploidies    We discussed that definitive diagnosis is only available by CVS or amniocentesis, and the limitations and potential risks with the procedures  We also discussed the availability of preimplantation genetic diagnosis (PGD) with in vitro fertilization (IVF)  PGD is a reproductive technology used with an IVF cycle for diagnosis of a genetic disease in early embryos prior to implantation and pregnancy  We discussed some of the risks, benefits, and limitations of PGD including multiple IVF cycles, cost involved and possibility of an unaffected child  PGD is typically available for familial mutations and other aneuploidies  Lastly, we discussed the fact that everyone in the general population regardless of age, family history, or medical background has approximately a 3-5% risk of having a child with some type of congenital anomaly, genetic disease or intellectual disability  Currently there are no tests available to rule out all birth defects or health problems  Missy Huerta was provided with our contact information  I encouraged her to call with any questions or concerns  Plan/Tests Ordered:  1) Patient elected OG standard of care carrier screening but my pursue an expanded panel - [employer] message sent with available options and patient will notify our office with her decision  2) Follow up genetic counseling as clinically indicated or when a pregnancy is achieved          HPI     Past Medical History:   Diagnosis Date   • Allergic rhinitis 04/30/2015   • Anxiety    • Bell's palsy 02/04/2016   • Generalized anxiety disorder 10/04/2012   • Migraine 10/03/2014   • PONV (postoperative nausea and vomiting)    • Reactive airway disease 09/05/2013   • Seasonal allergies    • Shortness of breath 09/05/2013       Past Surgical History:   Procedure Laterality Date   • ABDOMINOPLASTY     • BUNIONECTOMY     • MOUTH SURGERY      Tooth Extraction Widsom Tooth   • OH REV UPPER EYELID W EXCESS SKIN Bilateral 6/2/2022    Procedure: BLEPHAROPLASTY UPPER;  Surgeon: Matthew Deshpande MD;  Location: [facility] MAIN OR;  Service: Plastics       Current Outpatient Medications   Medication Sig Dispense Refill   • albuterol (PROVENTIL HFA,VENTOLIN HFA) 90 mcg/act inhaler Inhale 2 puffs every 6 (six) hours as needed     • clindamycin (CLINDAGEL) 1 % gel Apply topically 2 (two) times a day 30 g 0   • fluticasone (FLONASE) 50 mcg/act nasal spray 1 spray into each nostril 2 (two) times a day 16 g 11   • levothyroxine 50 mcg tablet Take 50 mcg by mouth daily before breakfast Take on an empty stomach     • liothyronine (CYTOMEL) 25 mcg tablet TAKE ONE-HALF TABLET BY MOUTH EVERY DAY ON AN EMPTY STOMACH     • Probiotic Product (PROBIOTIC PO) Take by mouth in the morning       • rizatriptan (MAXALT-MLT) 10 MG disintegrating tablet Take 1 tablet by mouth daily as needed     • VITAMIN D-VITAMIN K PO Take by mouth       No current facility-administered medications for this visit  Allergies   Allergen Reactions   • Diphenhydramine Hallucinations       Review of Systems    Video Exam    There were no vitals filed for this visit      Physical Exam     I spent 30 minutes directly with the patient during this visit

## 2022-10-18 ENCOUNTER — TELEPHONE (OUTPATIENT)
Dept: PERINATAL CARE | Facility: CLINIC | Age: 37
End: 2022-10-18

## 2022-11-11 ENCOUNTER — TELEPHONE (OUTPATIENT)
Dept: PERINATAL CARE | Facility: CLINIC | Age: 37
End: 2022-11-11

## 2022-11-11 NOTE — TELEPHONE ENCOUNTER
Patient called and confirmed date of birth  Discussed abnormal expanded carrier screening positive for Hemochromatosis  Briefly reviewed the morbidity and mortality associated with it  Also discussed the autosomal recessive inheritance pattern, thus if her partner is also a carrier there is a 25% chance for a pregnancy to be affected  Patient asked if embryo testing could be done if Sabrina Sotelo is a carrier  Discussed that it would depend on the embryo testing lab as some prefer to test for familial mutations for more severe conditions  Carrier screening was offered for FOB for just the condition in question or an entire expanded panel  Patient stated she will talk to partner and get back to me about further testing  Reviewed that a kit can be sent directly to him for his screening  FOB's date of birth and insurance information was confirmed  Patient had no further questions

## 2022-12-16 ENCOUNTER — TELEPHONE (OUTPATIENT)
Facility: HOSPITAL | Age: 37
End: 2022-12-16

## 2022-12-16 NOTE — TELEPHONE ENCOUNTER
Patient and partner had viewed his negative results in his [employer]  Left message for patient confirming that it was negative and he is not a carrier for hemochromatosis  Reviewed that this significantly reduces the risk for an affected pregnancy  Provided genetic counseling phone number for any questions or concerns

## 2023-01-16 ENCOUNTER — TELEPHONE (OUTPATIENT)
Dept: OBGYN CLINIC | Facility: MEDICAL CENTER | Age: 38
End: 2023-01-16

## 2023-01-16 DIAGNOSIS — N92.6 MISSED MENSES: Primary | ICD-10-CM

## 2023-01-17 ENCOUNTER — TELEPHONE (OUTPATIENT)
Dept: OBGYN CLINIC | Facility: CLINIC | Age: 38
End: 2023-01-17

## 2023-01-17 NOTE — TELEPHONE ENCOUNTER
Pt called in would like to know how much iron intake she should be taking with prenatal vitamins since recently finding out pregnancy   Please advise thank you

## 2023-01-19 ENCOUNTER — TELEPHONE (OUTPATIENT)
Dept: OBGYN CLINIC | Facility: CLINIC | Age: 38
End: 2023-01-19

## 2023-01-19 NOTE — TELEPHONE ENCOUNTER
LMOM for patient to call office to discuss  Message stated pt should take 325 mg ferrous sulfate if her prenatal vitamin does not have any iron   If prenatal has iron, she is ok with that

## 2023-01-19 NOTE — TELEPHONE ENCOUNTER
Patient called into office in regards to asking if there is a specific brand of iron sulfate that would be preferable during pregnancy as well as some other questions  Please review  Thank you!

## 2023-10-18 NOTE — TELEPHONE ENCOUNTER
Patient called with some questions regarding the carrier screening options  Reviewed that the conditions screened for are autosomal recessive(AR) or x-linked  Having negative results significantly reduces the chance for an affected pregnancy  (maybe 1/10,000 chance) but does not eliminate it completely  Negative partner results would also significantly decrease the risk  We reviewed that if both are carriers for an AR condition the risk to a pregnancy would be 25% and many couples opt for IVF with embryo testing depending on the severity of the disease  Discussed that if Sha Chiang is identified to be a carrier of an X-linked condition the risk to a pregnancy would be 50% chance however typically only males exhibit symptoms of the disease  Thus, testing on embryos for the disease in question can be done or just a female embryo transferred  Patient stated that they are leaning toward the [employer] testing but she will discuss this information with her  and get back to me later today with their decision  We discussed that a blood draw can be arranged or [employer] can ship a saliva kit directly to her for her testing  Patient expressed verbal understanding and had no questions 
2 = A lot of assistance

## (undated) DEVICE — STERILE POLYISOPRENE POWDER-FREE SURGICAL GLOVES: Brand: PROTEXIS

## (undated) DEVICE — DISPOSABLE OR TOWEL: Brand: CARDINAL HEALTH

## (undated) DEVICE — TIBURON SPLIT SHEET: Brand: CONVERTORS

## (undated) DEVICE — SPONGE 4 X 4 XRAY 16 PLY STRL LF RFD

## (undated) DEVICE — INTENDED FOR TISSUE SEPARATION, AND OTHER PROCEDURES THAT REQUIRE A SHARP SURGICAL BLADE TO PUNCTURE OR CUT.: Brand: BARD-PARKER ® SAFETYLOCK CARBON RIB-BACK BLADES

## (undated) DEVICE — SYRINGE 10ML LL CONTROL TOP

## (undated) DEVICE — CABLE BIPOLAR DISP MEGADYNE

## (undated) DEVICE — SYRINGE 30ML LL

## (undated) DEVICE — CRADLE EXTREMITY UNIVERSAL CONTOURED

## (undated) DEVICE — 1820 FOAM BLOCK NEEDLE COUNTER: Brand: DEVON

## (undated) DEVICE — DRAPE FLUID WARMER (BIRD BATH)

## (undated) DEVICE — SKIN MARKER DUAL TIP WITH RULER CAP, FLEXIBLE RULER AND LABELS: Brand: DEVON

## (undated) DEVICE — ICE PACK EYE

## (undated) DEVICE — BASIC PACK: Brand: CONVERTORS

## (undated) DEVICE — NEEDLE BLUNT 18 G X 1 1/2IN

## (undated) DEVICE — LIGHT GLOVE GREEN

## (undated) DEVICE — GAUZE SPONGES,16 PLY: Brand: CURITY

## (undated) DEVICE — NEEDLE 25G X 1 1/2